# Patient Record
Sex: FEMALE | Race: WHITE | NOT HISPANIC OR LATINO | Employment: OTHER | URBAN - METROPOLITAN AREA
[De-identification: names, ages, dates, MRNs, and addresses within clinical notes are randomized per-mention and may not be internally consistent; named-entity substitution may affect disease eponyms.]

---

## 2017-01-10 ENCOUNTER — LAB (OUTPATIENT)
Dept: INTERNAL MEDICINE | Facility: CLINIC | Age: 52
End: 2017-01-10

## 2017-01-10 DIAGNOSIS — E53.8 B12 DEFICIENCY: Primary | ICD-10-CM

## 2017-01-10 PROCEDURE — 96372 THER/PROPH/DIAG INJ SC/IM: CPT | Performed by: INTERNAL MEDICINE

## 2017-01-10 RX ADMIN — CYANOCOBALAMIN 1000 MCG: 1000 INJECTION, SOLUTION INTRAMUSCULAR; SUBCUTANEOUS at 15:11

## 2017-02-20 ENCOUNTER — LAB (OUTPATIENT)
Dept: INTERNAL MEDICINE | Facility: CLINIC | Age: 52
End: 2017-02-20

## 2017-02-20 DIAGNOSIS — E53.8 B12 DEFICIENCY: Primary | ICD-10-CM

## 2017-02-20 PROCEDURE — 96372 THER/PROPH/DIAG INJ SC/IM: CPT | Performed by: INTERNAL MEDICINE

## 2017-02-20 RX ORDER — CYANOCOBALAMIN 1000 UG/ML
1000 INJECTION, SOLUTION INTRAMUSCULAR; SUBCUTANEOUS
Status: DISCONTINUED | OUTPATIENT
Start: 2017-02-20 | End: 2020-10-30

## 2017-02-20 RX ADMIN — CYANOCOBALAMIN 1000 MCG: 1000 INJECTION, SOLUTION INTRAMUSCULAR; SUBCUTANEOUS at 09:00

## 2017-05-15 ENCOUNTER — LAB (OUTPATIENT)
Dept: INTERNAL MEDICINE | Facility: CLINIC | Age: 52
End: 2017-05-15

## 2017-05-15 DIAGNOSIS — E53.8 B12 DEFICIENCY: Primary | ICD-10-CM

## 2017-05-15 PROCEDURE — 96372 THER/PROPH/DIAG INJ SC/IM: CPT | Performed by: INTERNAL MEDICINE

## 2017-05-15 RX ORDER — CYANOCOBALAMIN 1000 UG/ML
1000 INJECTION, SOLUTION INTRAMUSCULAR; SUBCUTANEOUS
Status: DISCONTINUED | OUTPATIENT
Start: 2017-05-15 | End: 2020-10-30

## 2017-05-15 RX ADMIN — CYANOCOBALAMIN 1000 MCG: 1000 INJECTION, SOLUTION INTRAMUSCULAR; SUBCUTANEOUS at 08:42

## 2017-06-01 ENCOUNTER — TELEPHONE (OUTPATIENT)
Dept: INTERNAL MEDICINE | Facility: CLINIC | Age: 52
End: 2017-06-01

## 2017-06-01 NOTE — TELEPHONE ENCOUNTER
PATIENT WAS SCHEDULED TO SEE DR. METCALF ON 06/06/2017. PATIENT HAD TO CANCEL THIS APPOINTMENT AND WOULD LIKE TO RESCHEDULE THIS APPOINTMENT. YOU CAN REACH HER BACK [phone]

## 2017-06-20 ENCOUNTER — CLINICAL SUPPORT (OUTPATIENT)
Dept: INTERNAL MEDICINE | Facility: CLINIC | Age: 52
End: 2017-06-20

## 2017-06-20 DIAGNOSIS — E53.8 B12 DEFICIENCY: Primary | ICD-10-CM

## 2017-06-20 PROCEDURE — 96372 THER/PROPH/DIAG INJ SC/IM: CPT | Performed by: INTERNAL MEDICINE

## 2017-06-20 RX ORDER — CYANOCOBALAMIN 1000 UG/ML
1000 INJECTION, SOLUTION INTRAMUSCULAR; SUBCUTANEOUS
Status: DISCONTINUED | OUTPATIENT
Start: 2017-06-20 | End: 2020-10-30

## 2017-06-20 RX ADMIN — CYANOCOBALAMIN 1000 MCG: 1000 INJECTION, SOLUTION INTRAMUSCULAR; SUBCUTANEOUS at 15:50

## 2017-08-28 ENCOUNTER — CLINICAL SUPPORT (OUTPATIENT)
Dept: INTERNAL MEDICINE | Facility: CLINIC | Age: 52
End: 2017-08-28

## 2017-08-28 DIAGNOSIS — E53.8 B12 DEFICIENCY: Primary | ICD-10-CM

## 2017-08-28 PROCEDURE — 96372 THER/PROPH/DIAG INJ SC/IM: CPT | Performed by: INTERNAL MEDICINE

## 2017-08-28 RX ORDER — CYANOCOBALAMIN 1000 UG/ML
1000 INJECTION, SOLUTION INTRAMUSCULAR; SUBCUTANEOUS
Status: DISCONTINUED | OUTPATIENT
Start: 2017-08-28 | End: 2020-10-30

## 2017-08-28 RX ADMIN — CYANOCOBALAMIN 1000 MCG: 1000 INJECTION, SOLUTION INTRAMUSCULAR; SUBCUTANEOUS at 10:16

## 2017-10-11 ENCOUNTER — OFFICE VISIT (OUTPATIENT)
Dept: ENDOCRINOLOGY | Facility: CLINIC | Age: 52
End: 2017-10-11

## 2017-10-11 VITALS
WEIGHT: 199.8 LBS | HEART RATE: 72 BPM | HEIGHT: 65 IN | SYSTOLIC BLOOD PRESSURE: 124 MMHG | BODY MASS INDEX: 33.29 KG/M2 | DIASTOLIC BLOOD PRESSURE: 78 MMHG | OXYGEN SATURATION: 98 %

## 2017-10-11 DIAGNOSIS — R73.03 PREDIABETES: Primary | ICD-10-CM

## 2017-10-11 DIAGNOSIS — E06.3 HYPOTHYROIDISM DUE TO HASHIMOTO'S THYROIDITIS: ICD-10-CM

## 2017-10-11 DIAGNOSIS — E83.52 HYPERCALCEMIA: ICD-10-CM

## 2017-10-11 DIAGNOSIS — E55.9 VITAMIN D DEFICIENCY: ICD-10-CM

## 2017-10-11 DIAGNOSIS — E03.8 HYPOTHYROIDISM DUE TO HASHIMOTO'S THYROIDITIS: ICD-10-CM

## 2017-10-11 LAB
25(OH)D3 SERPL-MCNC: 28.1 NG/ML
ABO GROUP BLD: NORMAL
ALBUMIN SERPL-MCNC: 4.4 G/DL (ref 3.2–4.8)
ALBUMIN/GLOB SERPL: 1.7 G/DL (ref 1.5–2.5)
ALP SERPL-CCNC: 65 U/L (ref 25–100)
ALT SERPL W P-5'-P-CCNC: 30 U/L (ref 7–40)
ANION GAP SERPL CALCULATED.3IONS-SCNC: 2 MMOL/L (ref 3–11)
ARTICHOKE IGE QN: 118 MG/DL (ref 0–130)
AST SERPL-CCNC: 20 U/L (ref 0–33)
BILIRUB SERPL-MCNC: 0.4 MG/DL (ref 0.3–1.2)
BUN BLD-MCNC: 20 MG/DL (ref 9–23)
BUN/CREAT SERPL: 28.6 (ref 7–25)
CALCIUM SPEC-SCNC: 9.8 MG/DL (ref 8.7–10.4)
CHLORIDE SERPL-SCNC: 108 MMOL/L (ref 99–109)
CHOLEST SERPL-MCNC: 189 MG/DL (ref 0–200)
CO2 SERPL-SCNC: 34 MMOL/L (ref 20–31)
CREAT BLD-MCNC: 0.7 MG/DL (ref 0.6–1.3)
GFR SERPL CREATININE-BSD FRML MDRD: 88 ML/MIN/1.73
GLOBULIN UR ELPH-MCNC: 2.6 GM/DL
GLUCOSE BLD-MCNC: 112 MG/DL (ref 70–100)
GLUCOSE BLDC GLUCOMTR-MCNC: 127 MG/DL (ref 70–130)
HBA1C MFR BLD: 5.7 %
HDLC SERPL-MCNC: 39 MG/DL (ref 40–60)
POTASSIUM BLD-SCNC: 5 MMOL/L (ref 3.5–5.5)
PROT SERPL-MCNC: 7 G/DL (ref 5.7–8.2)
RH BLD: POSITIVE
SODIUM BLD-SCNC: 144 MMOL/L (ref 132–146)
T4 FREE SERPL-MCNC: 1.34 NG/DL (ref 0.89–1.76)
TRIGL SERPL-MCNC: 168 MG/DL (ref 0–150)
TSH SERPL DL<=0.05 MIU/L-ACNC: 0.55 MIU/ML (ref 0.35–5.35)
VIT B12 BLD-MCNC: 343 PG/ML (ref 211–911)

## 2017-10-11 PROCEDURE — 80061 LIPID PANEL: CPT | Performed by: INTERNAL MEDICINE

## 2017-10-11 PROCEDURE — 83036 HEMOGLOBIN GLYCOSYLATED A1C: CPT | Performed by: INTERNAL MEDICINE

## 2017-10-11 PROCEDURE — 84439 ASSAY OF FREE THYROXINE: CPT | Performed by: INTERNAL MEDICINE

## 2017-10-11 PROCEDURE — 86900 BLOOD TYPING SEROLOGIC ABO: CPT | Performed by: INTERNAL MEDICINE

## 2017-10-11 PROCEDURE — 80053 COMPREHEN METABOLIC PANEL: CPT | Performed by: INTERNAL MEDICINE

## 2017-10-11 PROCEDURE — 99213 OFFICE O/P EST LOW 20 MIN: CPT | Performed by: INTERNAL MEDICINE

## 2017-10-11 PROCEDURE — 84443 ASSAY THYROID STIM HORMONE: CPT | Performed by: INTERNAL MEDICINE

## 2017-10-11 PROCEDURE — 82947 ASSAY GLUCOSE BLOOD QUANT: CPT | Performed by: INTERNAL MEDICINE

## 2017-10-11 PROCEDURE — 96372 THER/PROPH/DIAG INJ SC/IM: CPT | Performed by: INTERNAL MEDICINE

## 2017-10-11 PROCEDURE — 82306 VITAMIN D 25 HYDROXY: CPT | Performed by: INTERNAL MEDICINE

## 2017-10-11 PROCEDURE — 82607 VITAMIN B-12: CPT | Performed by: INTERNAL MEDICINE

## 2017-10-11 PROCEDURE — 86901 BLOOD TYPING SEROLOGIC RH(D): CPT | Performed by: INTERNAL MEDICINE

## 2017-10-11 RX ORDER — ERGOCALCIFEROL 1.25 MG/1
1 CAPSULE ORAL WEEKLY
Qty: 4 CAPSULE | Refills: 5 | Status: SHIPPED | OUTPATIENT
Start: 2017-10-11 | End: 2022-12-15 | Stop reason: SDUPTHER

## 2017-10-11 RX ORDER — LEVOTHYROXINE SODIUM 112 MCG
112 TABLET ORAL DAILY
Qty: 90 TABLET | Refills: 3 | Status: SHIPPED | OUTPATIENT
Start: 2017-10-11 | End: 2018-12-18 | Stop reason: SDUPTHER

## 2017-10-11 RX ADMIN — CYANOCOBALAMIN 1000 MCG: 1000 INJECTION, SOLUTION INTRAMUSCULAR; SUBCUTANEOUS at 08:55

## 2017-10-11 NOTE — PROGRESS NOTES
Chief complaint  Hashimoto's Thyroiditis (F/u for hypothyroidism, vitamin d deficiency and prediabetes); Vitamin D Deficiency; and Prediabetes    Subjective   Adeola Izaguirre is a 52 y.o. female is here today for follow-up.  Hypothyroidism: The patient is being seen for follow-up of hypothyroidism. The patient has Hashimoto's thyroiditis since age 17. Recent results: reviewed. Current treatment includes Synthroid 112 mcg.   Symptoms: fatigue, weight gain, constipation, diarrhea, weakness, myalgias, arthralgias and peripheral edema. The patient is currently experiencing symptoms.     Prediabetes her A1C was stable and she is managing prediabetes with diet, avoids pasta, bread.     Vitamin D deficiency 50 000 IU every other week and 5000 IU daily     B12 level was low and she had monthly injections and didn't noticed significant improvement. Last injection was August 28.       Medications    Current Outpatient Prescriptions:   •  Cholecalciferol (VITAMIN D3) 5000 UNITS tablet, Take  by mouth., Disp: , Rfl:   •  DULoxetine (CYMBALTA) 30 MG capsule, TAKE ONE CAPSULE BY MOUTH EVERY DAY, Disp: , Rfl: 5  •  ergocalciferol (ERGOCALCIFEROL) 05591 UNITS capsule, Take 1 capsule by mouth 1 (One) Time Per Week., Disp: 15 capsule, Rfl: 3  •  SYNTHROID 112 MCG tablet, Take 1 tablet by mouth Daily., Disp: 90 tablet, Rfl: 3  •  travoprost, KISHAN free, (TRAVATAN Z) 0.004 % solution ophthalmic solution, Apply  to eye., Disp: , Rfl:     Current Facility-Administered Medications:   •  cyanocobalamin injection 1,000 mcg, 1,000 mcg, Intramuscular, Q28 Days, Betty MAX MD  •  cyanocobalamin injection 1,000 mcg, 1,000 mcg, Intramuscular, Q30 Days, Betty MAX MD, 1,000 mcg at 02/20/17 0900  •  cyanocobalamin injection 1,000 mcg, 1,000 mcg, Intramuscular, Q28 Days, Betty MAX MD, 1,000 mcg at 05/15/17 0842  •  cyanocobalamin injection 1,000 mcg, 1,000 mcg, Intramuscular, Q28 Days, Betty MAX MD, 1,000 mcg at 06/20/17 1550  •   "cyanocobalamin injection 1,000 mcg, 1,000 mcg, Intramuscular, Q28 Days, Betty MAX MD, 1,000 mcg at 08/28/17 1016    PMH  The following portions of the patient's history were reviewed and updated as appropriate: allergies, current medications, past family history, past medical history, past social history, past surgical history and problem list.    Review of systems  Review of Systems   Constitutional: Positive for fatigue and unexpected weight change (weight gain). Negative for activity change, appetite change, chills and diaphoresis.   HENT: Negative for congestion, ear pain, facial swelling, hearing loss, postnasal drip, sore throat, trouble swallowing and voice change.    Eyes: Negative.  Negative for redness, itching and visual disturbance.   Respiratory: Negative for cough and shortness of breath.    Cardiovascular: Positive for leg swelling. Negative for chest pain and palpitations.   Gastrointestinal: Negative for abdominal distention, abdominal pain, constipation, diarrhea, nausea and vomiting.   Endocrine:        As listed in HPI   Genitourinary: Negative.    Musculoskeletal: Positive for arthralgias and myalgias. Negative for back pain, joint swelling, neck pain and neck stiffness.   Skin: Negative.    Allergic/Immunologic: Negative.    Neurological: Negative for dizziness, tremors, syncope, weakness, light-headedness and headaches.   Hematological: Negative.    Psychiatric/Behavioral: Negative.    All other systems reviewed and are negative.      Physical exam  Objective   Blood pressure 124/78, pulse 72, height 65\" (165.1 cm), weight 199 lb 12.8 oz (90.6 kg), SpO2 98 %. Body mass index is 33.25 kg/(m^2).  Physical Exam   Constitutional: She is oriented to person, place, and time. She appears well-developed and well-nourished.   HENT:   Head: Normocephalic and atraumatic.   Mouth/Throat: Oropharynx is clear and moist.   Eyes: Conjunctivae are normal.   Neck: Normal range of motion. No muscular " tenderness present. Carotid bruit is not present. No thyroid mass and no thyromegaly present.   The neck is supple and no assimetry visualized   Cardiovascular: Normal rate, regular rhythm and normal heart sounds.    Pulmonary/Chest: Effort normal and breath sounds normal.   Musculoskeletal: She exhibits no edema.   Lymphadenopathy:     She has no cervical adenopathy.   Neurological: She is alert and oriented to person, place, and time.   Skin: Skin is warm. No rash noted.   Psychiatric: She has a normal mood and affect. Thought content normal.   Vitals reviewed.        LABS AND IMAGING  Office Visit on 10/11/2017   Component Date Value Ref Range Status   • Hemoglobin A1C 10/11/2017 5.7  % Final   • Glucose 10/11/2017 127  70 - 130 mg/dL Final           Assessment  Assessment/Plan   Problem List Items Addressed This Visit        Digestive    Vitamin D deficiency       Endocrine    Hypothyroidism       Other    Prediabetes - Primary    Relevant Orders    POC Glycosylated Hemoglobin (Hb A1C) (Completed)    POC Glucose Fingerstick (Completed)    Hypercalcemia          Plan  -Synthroid 112 mcg.  -B12 injections recommended, the level is low - adminisster first dose today, monthly afterwards  -Restart Vit D 50 000 IU weekly in addition to 5000 daily OTC supplements.   -Continue with lifestyle and diet. Doing well.    -reviewed diet.   Repeat labs in 4 months before the visit: A1C, CMP, TSh, free T4 and Vit D level.

## 2017-10-11 NOTE — PATIENT INSTRUCTIONS
Results for orders placed or performed in visit on 10/11/17   POC Glycosylated Hemoglobin (Hb A1C)   Result Value Ref Range    Hemoglobin A1C 5.7 %   POC Glucose Fingerstick   Result Value Ref Range    Glucose 127 70 - 130 mg/dL

## 2018-03-22 ENCOUNTER — TRANSCRIBE ORDERS (OUTPATIENT)
Dept: ADMINISTRATIVE | Facility: HOSPITAL | Age: 53
End: 2018-03-22

## 2018-03-22 DIAGNOSIS — N93.9 UTERINE BLEEDING: Primary | ICD-10-CM

## 2018-03-27 ENCOUNTER — APPOINTMENT (OUTPATIENT)
Dept: ULTRASOUND IMAGING | Facility: HOSPITAL | Age: 53
End: 2018-03-27

## 2018-03-29 ENCOUNTER — APPOINTMENT (OUTPATIENT)
Dept: ULTRASOUND IMAGING | Facility: HOSPITAL | Age: 53
End: 2018-03-29

## 2018-04-27 ENCOUNTER — HOSPITAL ENCOUNTER (OUTPATIENT)
Dept: ULTRASOUND IMAGING | Facility: HOSPITAL | Age: 53
Discharge: HOME | End: 2018-04-27
Admitting: FAMILY MEDICINE

## 2018-04-27 DIAGNOSIS — N93.9 UTERINE BLEEDING: ICD-10-CM

## 2018-04-27 PROCEDURE — 76830 TRANSVAGINAL US NON-OB: CPT

## 2018-12-18 RX ORDER — LEVOTHYROXINE SODIUM 112 MCG
112 TABLET ORAL DAILY
Qty: 30 TABLET | Refills: 0 | Status: SHIPPED | OUTPATIENT
Start: 2018-12-18 | End: 2019-01-10 | Stop reason: SDUPTHER

## 2019-01-02 RX ORDER — LEVOTHYROXINE SODIUM 112 MCG
112 TABLET ORAL DAILY
Qty: 90 TABLET | Refills: 3 | OUTPATIENT
Start: 2019-01-02

## 2019-01-10 RX ORDER — LEVOTHYROXINE SODIUM 112 MCG
112 TABLET ORAL DAILY
Qty: 30 TABLET | Refills: 3 | Status: SHIPPED | OUTPATIENT
Start: 2019-01-10 | End: 2019-05-01 | Stop reason: SDUPTHER

## 2019-01-10 NOTE — TELEPHONE ENCOUNTER
Received refill request via fax from [facility] pharmacy for Synthroid 112, refilled enough med for pt to last until next follow up appt with Dr. Hernandez.

## 2019-05-01 RX ORDER — LEVOTHYROXINE SODIUM 112 MCG
TABLET ORAL
Qty: 30 TABLET | Refills: 2 | Status: SHIPPED | OUTPATIENT
Start: 2019-05-01 | End: 2019-05-08

## 2019-05-06 ENCOUNTER — TELEPHONE (OUTPATIENT)
Dept: INTERNAL MEDICINE | Facility: CLINIC | Age: 54
End: 2019-05-06

## 2019-05-06 DIAGNOSIS — E83.52 HYPERCALCEMIA: ICD-10-CM

## 2019-05-06 DIAGNOSIS — E55.9 VITAMIN D DEFICIENCY: Primary | ICD-10-CM

## 2019-05-06 DIAGNOSIS — E53.8 B12 DEFICIENCY: ICD-10-CM

## 2019-05-06 DIAGNOSIS — E03.9 ACQUIRED HYPOTHYROIDISM: ICD-10-CM

## 2019-05-06 DIAGNOSIS — R73.03 PREDIABETES: ICD-10-CM

## 2019-05-06 NOTE — TELEPHONE ENCOUNTER
PT HAS AN APPT ON Wednesday AND WAS UNDER THE IMPRESSION THAT SHE WOULD NEED TO GET LABS DONE PRIOR TO.    THERE ARE NO OPEN ORDERS.    PLEASE ADVISE.

## 2019-05-07 ENCOUNTER — LAB (OUTPATIENT)
Dept: INTERNAL MEDICINE | Facility: CLINIC | Age: 54
End: 2019-05-07

## 2019-05-07 DIAGNOSIS — E83.52 HYPERCALCEMIA: ICD-10-CM

## 2019-05-07 DIAGNOSIS — E55.9 VITAMIN D DEFICIENCY: ICD-10-CM

## 2019-05-07 DIAGNOSIS — R73.03 PREDIABETES: ICD-10-CM

## 2019-05-07 DIAGNOSIS — E03.9 ACQUIRED HYPOTHYROIDISM: ICD-10-CM

## 2019-05-07 DIAGNOSIS — E53.8 B12 DEFICIENCY: ICD-10-CM

## 2019-05-07 LAB
ALBUMIN SERPL-MCNC: 4.5 G/DL (ref 3.5–5.2)
ALBUMIN/GLOB SERPL: 1.8 G/DL
ALP SERPL-CCNC: 48 U/L (ref 39–117)
ALT SERPL W P-5'-P-CCNC: 24 U/L (ref 1–33)
ANION GAP SERPL CALCULATED.3IONS-SCNC: 11.8 MMOL/L
AST SERPL-CCNC: 22 U/L (ref 1–32)
BILIRUB SERPL-MCNC: 0.4 MG/DL (ref 0.2–1.2)
BUN BLD-MCNC: 15 MG/DL (ref 6–20)
BUN/CREAT SERPL: 22.1 (ref 7–25)
CALCIUM SPEC-SCNC: 9.3 MG/DL (ref 8.6–10.5)
CHLORIDE SERPL-SCNC: 108 MMOL/L (ref 98–107)
CHOLEST SERPL-MCNC: 164 MG/DL (ref 0–200)
CO2 SERPL-SCNC: 25.2 MMOL/L (ref 22–29)
CREAT BLD-MCNC: 0.68 MG/DL (ref 0.57–1)
GFR SERPL CREATININE-BSD FRML MDRD: 90 ML/MIN/1.73
GLOBULIN UR ELPH-MCNC: 2.5 GM/DL
GLUCOSE BLD-MCNC: 92 MG/DL (ref 65–99)
HBA1C MFR BLD: 5.7 % (ref 4.8–5.6)
HDLC SERPL-MCNC: 37 MG/DL (ref 40–60)
LDLC SERPL CALC-MCNC: 101 MG/DL (ref 0–100)
LDLC/HDLC SERPL: 2.74 {RATIO}
POTASSIUM BLD-SCNC: 4.7 MMOL/L (ref 3.5–5.2)
PROT SERPL-MCNC: 7 G/DL (ref 6–8.5)
SODIUM BLD-SCNC: 145 MMOL/L (ref 136–145)
T4 FREE SERPL-MCNC: 1.48 NG/DL (ref 0.93–1.7)
TRIGL SERPL-MCNC: 128 MG/DL (ref 0–150)
TSH SERPL DL<=0.05 MIU/L-ACNC: 0.23 MIU/ML (ref 0.27–4.2)
VIT B12 BLD-MCNC: 273 PG/ML (ref 211–946)
VLDLC SERPL-MCNC: 25.6 MG/DL (ref 5–40)

## 2019-05-07 PROCEDURE — 84439 ASSAY OF FREE THYROXINE: CPT | Performed by: INTERNAL MEDICINE

## 2019-05-07 PROCEDURE — 82607 VITAMIN B-12: CPT | Performed by: INTERNAL MEDICINE

## 2019-05-07 PROCEDURE — 80053 COMPREHEN METABOLIC PANEL: CPT | Performed by: INTERNAL MEDICINE

## 2019-05-07 PROCEDURE — 84443 ASSAY THYROID STIM HORMONE: CPT | Performed by: INTERNAL MEDICINE

## 2019-05-07 PROCEDURE — 82652 VIT D 1 25-DIHYDROXY: CPT | Performed by: INTERNAL MEDICINE

## 2019-05-07 PROCEDURE — 83036 HEMOGLOBIN GLYCOSYLATED A1C: CPT | Performed by: INTERNAL MEDICINE

## 2019-05-07 PROCEDURE — 80061 LIPID PANEL: CPT | Performed by: INTERNAL MEDICINE

## 2019-05-08 ENCOUNTER — OFFICE VISIT (OUTPATIENT)
Dept: ENDOCRINOLOGY | Facility: CLINIC | Age: 54
End: 2019-05-08

## 2019-05-08 VITALS
OXYGEN SATURATION: 98 % | HEIGHT: 65 IN | BODY MASS INDEX: 34.45 KG/M2 | WEIGHT: 206.8 LBS | DIASTOLIC BLOOD PRESSURE: 78 MMHG | SYSTOLIC BLOOD PRESSURE: 118 MMHG | HEART RATE: 68 BPM

## 2019-05-08 DIAGNOSIS — E55.9 VITAMIN D DEFICIENCY: ICD-10-CM

## 2019-05-08 DIAGNOSIS — E06.3 HYPOTHYROIDISM DUE TO HASHIMOTO'S THYROIDITIS: Primary | ICD-10-CM

## 2019-05-08 DIAGNOSIS — E03.8 HYPOTHYROIDISM DUE TO HASHIMOTO'S THYROIDITIS: Primary | ICD-10-CM

## 2019-05-08 DIAGNOSIS — R73.03 PREDIABETES: ICD-10-CM

## 2019-05-08 DIAGNOSIS — E53.8 B12 DEFICIENCY: ICD-10-CM

## 2019-05-08 PROCEDURE — 96372 THER/PROPH/DIAG INJ SC/IM: CPT | Performed by: INTERNAL MEDICINE

## 2019-05-08 PROCEDURE — 99214 OFFICE O/P EST MOD 30 MIN: CPT | Performed by: INTERNAL MEDICINE

## 2019-05-08 RX ORDER — LEVOTHYROXINE AND LIOTHYRONINE 38; 9 UG/1; UG/1
60 TABLET ORAL DAILY
Qty: 30 TABLET | Refills: 11 | COMMUNITY
Start: 2019-05-08 | End: 2020-10-30

## 2019-05-08 RX ORDER — CYANOCOBALAMIN 1000 UG/ML
1000 INJECTION, SOLUTION INTRAMUSCULAR; SUBCUTANEOUS
Status: DISCONTINUED | OUTPATIENT
Start: 2019-05-08 | End: 2020-10-30

## 2019-05-08 RX ORDER — LEVOTHYROXINE SODIUM 112 MCG
112 TABLET ORAL DAILY
Qty: 30 TABLET | Refills: 11 | Status: SHIPPED | OUTPATIENT
Start: 2019-05-08 | End: 2020-05-20

## 2019-05-08 RX ADMIN — CYANOCOBALAMIN 1000 MCG: 1000 INJECTION, SOLUTION INTRAMUSCULAR; SUBCUTANEOUS at 12:54

## 2019-05-08 NOTE — PROGRESS NOTES
Chief complaint  Hypothyroidism (Follow Up) and Prediabetes    Subjective   Adeola Izaguirre is a 54 y.o. female is here today for follow-up.  Hypothyroidism: The patient is being seen for follow-up of hypothyroidism. The patient has Hashimoto's thyroiditis since age 17. Recent results: reviewed. Current treatment includes Synthroid 112 mcg.   Symptoms: fatigue, weight gain, constipation, diarrhea, weakness, myalgias, arthralgias and peripheral edema. The patient is currently experiencing symptoms. She would like to try different medication.     Prediabetes her A1C was stable and she is managing prediabetes with diet, avoids pasta, bread.     Vitamin D deficiency 50 000 IU every other week and 5000 IU daily     B12 level was low and she had monthly injections and didn't noticed significant improvement. She stopped them and levels are low on recent labs.       Medications    Current Outpatient Medications:   •  Cholecalciferol (VITAMIN D3) 5000 UNITS tablet, Take  by mouth., Disp: , Rfl:   •  ergocalciferol (ERGOCALCIFEROL) 47127 units capsule, Take 1 capsule by mouth 1 (One) Time Per Week., Disp: 4 capsule, Rfl: 5  •  SYNTHROID 112 MCG tablet, Take 1 tablet by mouth Daily., Disp: 30 tablet, Rfl: 11  •  travoprost, KISHAN free, (TRAVATAN Z) 0.004 % solution ophthalmic solution, Apply  to eye., Disp: , Rfl:   •  DULoxetine (CYMBALTA) 30 MG capsule, TAKE ONE CAPSULE BY MOUTH EVERY DAY, Disp: , Rfl: 5    Current Facility-Administered Medications:   •  cyanocobalamin injection 1,000 mcg, 1,000 mcg, Intramuscular, Q28 Days, Betty Hernandez MD  •  cyanocobalamin injection 1,000 mcg, 1,000 mcg, Intramuscular, Q30 Days, Betty Hernandez MD, 1,000 mcg at 02/20/17 0900  •  cyanocobalamin injection 1,000 mcg, 1,000 mcg, Intramuscular, Q28 Days, Betty Hernandez MD, 1,000 mcg at 05/15/17 0842  •  cyanocobalamin injection 1,000 mcg, 1,000 mcg, Intramuscular, Q28 Days, Betty Hernandez MD, 1,000 mcg at 10/11/17 0855  •  cyanocobalamin  "injection 1,000 mcg, 1,000 mcg, Intramuscular, Q28 Days, Betty Hernandez MD, 1,000 mcg at 08/28/17 1016    PMH  The following portions of the patient's history were reviewed and updated as appropriate: allergies, current medications, past family history, past medical history, past social history, past surgical history and problem list.    Review of systems  Review of Systems   Constitutional: Positive for fatigue and unexpected weight change (weight gain). Negative for activity change, appetite change, chills and diaphoresis.   HENT: Negative for congestion, ear pain, facial swelling, hearing loss, postnasal drip, sore throat, trouble swallowing and voice change.    Eyes: Negative.  Negative for redness, itching and visual disturbance.   Respiratory: Negative for cough and shortness of breath.    Cardiovascular: Positive for leg swelling. Negative for chest pain and palpitations.   Gastrointestinal: Negative for abdominal distention, abdominal pain, constipation, diarrhea, nausea and vomiting.   Endocrine:        As listed in HPI   Genitourinary: Negative.    Musculoskeletal: Positive for arthralgias and myalgias. Negative for back pain, joint swelling, neck pain and neck stiffness.   Skin: Negative.    Allergic/Immunologic: Negative.    Neurological: Negative for dizziness, tremors, syncope, weakness, light-headedness and headaches.   Hematological: Negative.    Psychiatric/Behavioral: Negative.    All other systems reviewed and are negative.      Physical exam  Objective   Blood pressure 118/78, pulse 68, height 165.1 cm (65\"), weight 93.8 kg (206 lb 12.8 oz), SpO2 98 %. Body mass index is 34.41 kg/m².  Physical Exam   Constitutional: She is oriented to person, place, and time. She appears well-developed and well-nourished.   HENT:   Head: Normocephalic and atraumatic.   Mouth/Throat: Oropharynx is clear and moist.   Eyes: Conjunctivae are normal.   Neck: Normal range of motion. No muscular tenderness present. " Carotid bruit is not present. No thyroid mass and no thyromegaly present.   The neck is supple and no assimetry visualized   Cardiovascular: Normal rate, regular rhythm and normal heart sounds.   Pulmonary/Chest: Effort normal and breath sounds normal.   Musculoskeletal: She exhibits no edema.   Lymphadenopathy:     She has no cervical adenopathy.   Neurological: She is alert and oriented to person, place, and time.   Skin: Skin is warm. No rash noted.   Psychiatric: She has a normal mood and affect. Thought content normal.   Vitals reviewed.        LABS AND IMAGING  Lab on 05/07/2019   Component Date Value Ref Range Status   • Total Cholesterol 05/07/2019 164  0 - 200 mg/dL Final   • Triglycerides 05/07/2019 128  0 - 150 mg/dL Final   • HDL Cholesterol 05/07/2019 37* 40 - 60 mg/dL Final   • LDL Cholesterol  05/07/2019 101* 0 - 100 mg/dL Final   • VLDL Cholesterol 05/07/2019 25.6  5 - 40 mg/dL Final   • LDL/HDL Ratio 05/07/2019 2.74   Final   • Vitamin B-12 05/07/2019 273  211 - 946 pg/mL Final   • Glucose 05/07/2019 92  65 - 99 mg/dL Final   • BUN 05/07/2019 15  6 - 20 mg/dL Final   • Creatinine 05/07/2019 0.68  0.57 - 1.00 mg/dL Final   • Sodium 05/07/2019 145  136 - 145 mmol/L Final   • Potassium 05/07/2019 4.7  3.5 - 5.2 mmol/L Final   • Chloride 05/07/2019 108* 98 - 107 mmol/L Final   • CO2 05/07/2019 25.2  22.0 - 29.0 mmol/L Final   • Calcium 05/07/2019 9.3  8.6 - 10.5 mg/dL Final   • Total Protein 05/07/2019 7.0  6.0 - 8.5 g/dL Final   • Albumin 05/07/2019 4.50  3.50 - 5.20 g/dL Final   • ALT (SGPT) 05/07/2019 24  1 - 33 U/L Final   • AST (SGOT) 05/07/2019 22  1 - 32 U/L Final   • Alkaline Phosphatase 05/07/2019 48  39 - 117 U/L Final   • Total Bilirubin 05/07/2019 0.4  0.2 - 1.2 mg/dL Final   • eGFR Non African Amer 05/07/2019 90  >60 mL/min/1.73 Final   • Globulin 05/07/2019 2.5  gm/dL Final   • A/G Ratio 05/07/2019 1.8  g/dL Final   • BUN/Creatinine Ratio 05/07/2019 22.1  7.0 - 25.0 Final   • Anion Gap  05/07/2019 11.8  mmol/L Final   • Free T4 05/07/2019 1.48  0.93 - 1.70 ng/dL Final   • TSH 05/07/2019 0.230* 0.270 - 4.200 mIU/mL Final   • Hemoglobin A1C 05/07/2019 5.70* 4.80 - 5.60 % Final           Assessment  Assessment/Plan   Problem List Items Addressed This Visit        Digestive    Vitamin D deficiency    B12 deficiency       Endocrine    Hypothyroidism - Primary    Relevant Medications    SYNTHROID 112 MCG tablet       Other    Prediabetes          Plan  -Synthroid 112 mcg changed to a trial of NP thyroid 60 mg. She would llike to try something with metabolic and energy benefits.     -B12 injections restarted , administer a dose today.     -Vit D 5000 daily OTC supplements.     -Continue with lifestyle and diet. Doing well, A1C is stable.    -reviewed diet. Lab results reviewed with the patient.     Repeat labs in 4 months

## 2019-05-09 LAB — 1,25(OH)2D3 SERPL-MCNC: 56.8 PG/ML (ref 19.9–79.3)

## 2019-06-17 ENCOUNTER — CLINICAL SUPPORT (OUTPATIENT)
Dept: INTERNAL MEDICINE | Facility: CLINIC | Age: 54
End: 2019-06-17

## 2019-06-17 DIAGNOSIS — E53.8 B12 DEFICIENCY: Primary | ICD-10-CM

## 2019-06-17 PROCEDURE — 96372 THER/PROPH/DIAG INJ SC/IM: CPT | Performed by: INTERNAL MEDICINE

## 2019-06-17 RX ADMIN — CYANOCOBALAMIN 1000 MCG: 1000 INJECTION, SOLUTION INTRAMUSCULAR; SUBCUTANEOUS at 10:34

## 2020-01-27 ENCOUNTER — TRANSCRIBE ORDERS (OUTPATIENT)
Dept: LAB | Facility: HOSPITAL | Age: 55
End: 2020-01-27

## 2020-01-27 ENCOUNTER — LAB (OUTPATIENT)
Dept: LAB | Facility: HOSPITAL | Age: 55
End: 2020-01-27

## 2020-01-27 DIAGNOSIS — E03.9 MYXEDEMA HEART DISEASE: ICD-10-CM

## 2020-01-27 DIAGNOSIS — R73.03 DIABETES MELLITUS, LATENT: ICD-10-CM

## 2020-01-27 DIAGNOSIS — I51.9 MYXEDEMA HEART DISEASE: ICD-10-CM

## 2020-01-27 DIAGNOSIS — R73.03 DIABETES MELLITUS, LATENT: Primary | ICD-10-CM

## 2020-01-27 DIAGNOSIS — E55.9 MILD VITAMIN D DEFICIENCY: ICD-10-CM

## 2020-01-27 DIAGNOSIS — E78.5 HYPERLIPIDEMIA, UNSPECIFIED HYPERLIPIDEMIA TYPE: ICD-10-CM

## 2020-01-27 LAB
25(OH)D3 SERPL-MCNC: 20.3 NG/ML (ref 30–100)
ALBUMIN SERPL-MCNC: 4.4 G/DL (ref 3.5–5.2)
ALBUMIN/GLOB SERPL: 1.4 G/DL
ALP SERPL-CCNC: 48 U/L (ref 39–117)
ALT SERPL W P-5'-P-CCNC: 25 U/L (ref 1–33)
ANION GAP SERPL CALCULATED.3IONS-SCNC: 13 MMOL/L (ref 5–15)
AST SERPL-CCNC: 21 U/L (ref 1–32)
BILIRUB SERPL-MCNC: 0.4 MG/DL (ref 0.2–1.2)
BUN BLD-MCNC: 15 MG/DL (ref 6–20)
BUN/CREAT SERPL: 21.1 (ref 7–25)
CALCIUM SPEC-SCNC: 9.7 MG/DL (ref 8.6–10.5)
CHLORIDE SERPL-SCNC: 106 MMOL/L (ref 98–107)
CHOLEST SERPL-MCNC: 171 MG/DL (ref 0–200)
CO2 SERPL-SCNC: 24 MMOL/L (ref 22–29)
CREAT BLD-MCNC: 0.71 MG/DL (ref 0.57–1)
GFR SERPL CREATININE-BSD FRML MDRD: 85 ML/MIN/1.73
GLOBULIN UR ELPH-MCNC: 3.1 GM/DL
GLUCOSE BLD-MCNC: 106 MG/DL (ref 65–99)
HBA1C MFR BLD: 6.18 % (ref 4.8–5.6)
HDLC SERPL-MCNC: 38 MG/DL (ref 40–60)
LDLC SERPL CALC-MCNC: 103 MG/DL (ref 0–100)
LDLC/HDLC SERPL: 2.7 {RATIO}
POTASSIUM BLD-SCNC: 4.2 MMOL/L (ref 3.5–5.2)
PROT SERPL-MCNC: 7.5 G/DL (ref 6–8.5)
SODIUM BLD-SCNC: 143 MMOL/L (ref 136–145)
TRIGL SERPL-MCNC: 152 MG/DL (ref 0–150)
TSH SERPL DL<=0.05 MIU/L-ACNC: 0.38 UIU/ML (ref 0.27–4.2)
VIT B12 BLD-MCNC: 322 PG/ML (ref 211–946)
VLDLC SERPL-MCNC: 30.4 MG/DL (ref 5–40)

## 2020-01-27 PROCEDURE — 36415 COLL VENOUS BLD VENIPUNCTURE: CPT

## 2020-01-27 PROCEDURE — 80053 COMPREHEN METABOLIC PANEL: CPT

## 2020-01-27 PROCEDURE — 83036 HEMOGLOBIN GLYCOSYLATED A1C: CPT

## 2020-01-27 PROCEDURE — 84443 ASSAY THYROID STIM HORMONE: CPT

## 2020-01-27 PROCEDURE — 80061 LIPID PANEL: CPT

## 2020-01-27 PROCEDURE — 82306 VITAMIN D 25 HYDROXY: CPT

## 2020-01-27 PROCEDURE — 82607 VITAMIN B-12: CPT

## 2020-05-20 RX ORDER — LEVOTHYROXINE SODIUM 112 MCG
TABLET ORAL
Qty: 90 TABLET | Refills: 0 | Status: SHIPPED | OUTPATIENT
Start: 2020-05-20 | End: 2020-08-31

## 2020-08-31 RX ORDER — LEVOTHYROXINE SODIUM 112 MCG
TABLET ORAL
Qty: 90 TABLET | Refills: 0 | Status: SHIPPED | OUTPATIENT
Start: 2020-08-31 | End: 2020-10-30

## 2020-10-30 ENCOUNTER — OFFICE VISIT (OUTPATIENT)
Dept: ENDOCRINOLOGY | Facility: CLINIC | Age: 55
End: 2020-10-30

## 2020-10-30 VITALS
WEIGHT: 215 LBS | TEMPERATURE: 97.5 F | OXYGEN SATURATION: 98 % | DIASTOLIC BLOOD PRESSURE: 76 MMHG | HEART RATE: 77 BPM | BODY MASS INDEX: 35.82 KG/M2 | SYSTOLIC BLOOD PRESSURE: 118 MMHG | HEIGHT: 65 IN

## 2020-10-30 DIAGNOSIS — E03.8 HYPOTHYROIDISM DUE TO HASHIMOTO'S THYROIDITIS: ICD-10-CM

## 2020-10-30 DIAGNOSIS — E06.3 HYPOTHYROIDISM DUE TO HASHIMOTO'S THYROIDITIS: ICD-10-CM

## 2020-10-30 DIAGNOSIS — E53.8 B12 DEFICIENCY: ICD-10-CM

## 2020-10-30 DIAGNOSIS — R73.03 PREDIABETES: Primary | ICD-10-CM

## 2020-10-30 DIAGNOSIS — E55.9 VITAMIN D DEFICIENCY: ICD-10-CM

## 2020-10-30 LAB
EXPIRATION DATE: NORMAL
EXPIRATION DATE: NORMAL
GLUCOSE BLDC GLUCOMTR-MCNC: 121 MG/DL (ref 70–130)
HBA1C MFR BLD: 6 %
Lab: NORMAL
Lab: NORMAL

## 2020-10-30 PROCEDURE — 82947 ASSAY GLUCOSE BLOOD QUANT: CPT | Performed by: INTERNAL MEDICINE

## 2020-10-30 PROCEDURE — 99213 OFFICE O/P EST LOW 20 MIN: CPT | Performed by: INTERNAL MEDICINE

## 2020-10-30 PROCEDURE — 83036 HEMOGLOBIN GLYCOSYLATED A1C: CPT | Performed by: INTERNAL MEDICINE

## 2020-10-30 PROCEDURE — 96372 THER/PROPH/DIAG INJ SC/IM: CPT | Performed by: INTERNAL MEDICINE

## 2020-10-30 RX ORDER — CYANOCOBALAMIN 1000 UG/ML
1000 INJECTION, SOLUTION INTRAMUSCULAR; SUBCUTANEOUS
Status: DISCONTINUED | OUTPATIENT
Start: 2020-10-30 | End: 2021-11-03

## 2020-10-30 RX ORDER — LEVOTHYROXINE AND LIOTHYRONINE 38; 9 UG/1; UG/1
60 TABLET ORAL DAILY
Qty: 30 TABLET | Refills: 11 | COMMUNITY
Start: 2020-10-30 | End: 2020-11-06 | Stop reason: SDUPTHER

## 2020-10-30 RX ORDER — LATANOPROST 50 UG/ML
SOLUTION/ DROPS OPHTHALMIC
COMMUNITY

## 2020-10-30 RX ADMIN — CYANOCOBALAMIN 1000 MCG: 1000 INJECTION, SOLUTION INTRAMUSCULAR; SUBCUTANEOUS at 12:27

## 2020-10-30 NOTE — PROGRESS NOTES
Chief complaint  Hypothyroidism (Follow Up) and Prediabetes    Subjective   Adeola Izaguirre is a 55 y.o. female is here today for follow-up.  Hypothyroidism: The patient is being seen for follow-up of hypothyroidism. The patient has Hashimoto's thyroiditis since age 17. Recent results: reviewed. Current treatment includes Synthroid 112 mcg.   Symptoms: fatigue, weight gain, constipation, diarrhea, weakness, myalgias, arthralgias and peripheral edema. The patient is currently experiencing symptoms. She would like to try different medication.     Prediabetes her A1C was stable and she is managing prediabetes with diet, avoids pasta, bread.     Vitamin D deficiency 50 000 IU every other week and 5000 IU daily. Levels were very low.      09/30/20 she had episode of the vertigo associated with the nausea, also noted increased frequency in urination.       Medications    Current Outpatient Medications:   •  Cholecalciferol (VITAMIN D3) 5000 UNITS tablet, Take  by mouth., Disp: , Rfl:   •  ergocalciferol (ERGOCALCIFEROL) 09427 units capsule, Take 1 capsule by mouth 1 (One) Time Per Week., Disp: 4 capsule, Rfl: 5  •  latanoprost (XALATAN) 0.005 % ophthalmic solution, latanoprost 0.005 % eye drops, Disp: , Rfl:   •  Thyroid (NP THYROID) 60 MG PO tablet, Take 1 tablet by mouth Daily., Disp: 30 tablet, Rfl: 11    Current Facility-Administered Medications:   •  cyanocobalamin injection 1,000 mcg, 1,000 mcg, Intramuscular, Q28 Days, Betty Hernandez MD, 1,000 mcg at 10/30/20 1227    PMH  The following portions of the patient's history were reviewed and updated as appropriate: allergies, current medications, past family history, past medical history, past social history, past surgical history and problem list.    Review of systems  Review of Systems   Constitutional: Positive for fatigue and unexpected weight change (weight gain). Negative for activity change, appetite change, chills and diaphoresis.   HENT: Negative for  "congestion, ear pain, facial swelling, hearing loss, postnasal drip, sore throat, trouble swallowing and voice change.    Eyes: Positive for itching. Negative for redness and visual disturbance.   Respiratory: Negative for cough and shortness of breath.    Cardiovascular: Positive for leg swelling. Negative for chest pain and palpitations.   Gastrointestinal: Positive for constipation. Negative for abdominal distention, abdominal pain, diarrhea, nausea and vomiting.   Endocrine:        As listed in HPI   Genitourinary: Negative.    Musculoskeletal: Positive for arthralgias, joint swelling and myalgias. Negative for back pain, neck pain and neck stiffness.   Skin: Negative.    Allergic/Immunologic: Negative.    Neurological: Negative for dizziness, tremors, syncope, weakness, light-headedness and headaches.   Hematological: Negative.    Psychiatric/Behavioral: Negative.    All other systems reviewed and are negative.      Physical exam  Objective   Blood pressure 118/76, pulse 77, temperature 97.5 °F (36.4 °C), height 165.1 cm (65\"), weight 97.5 kg (215 lb), SpO2 98 %. Body mass index is 35.78 kg/m².  Physical Exam   Constitutional: She is oriented to person, place, and time. She appears well-developed.   HENT:   Head: Normocephalic and atraumatic.   Eyes: Conjunctivae are normal.   Neck: Normal range of motion. No muscular tenderness present. Carotid bruit is not present. No thyroid mass and no thyromegaly present.   The neck is supple and no assimetry visualized   Cardiovascular: Normal rate, regular rhythm and normal heart sounds.   Pulmonary/Chest: Effort normal and breath sounds normal.   Lymphadenopathy:     She has no cervical adenopathy.   Neurological: She is alert and oriented to person, place, and time.   Skin: Skin is warm. No rash noted.   Psychiatric: Thought content normal.   Vitals reviewed.        LABS AND IMAGING  Office Visit on 10/30/2020   Component Date Value Ref Range Status   • Glucose " 10/30/2020 121  70 - 130 mg/dL Final   • Lot Number 10/30/2020 [id number]   Final   • Expiration Date 10/30/2020 06/02/2021   Final   • Hemoglobin A1C 10/30/2020 6.0  % Final   • Lot Number 10/30/2020 [id number]   Final   • Expiration Date 10/30/2020 08/17/2022   Final           Assessment  Assessment/Plan   Problems Addressed this Visit        Digestive    Vitamin D deficiency    Relevant Orders    Comprehensive Metabolic Panel    Vitamin D 25 Hydroxy    B12 deficiency    Relevant Medications    cyanocobalamin injection 1,000 mcg       Endocrine    Prediabetes - Primary    Relevant Orders    POC Glucose, Blood (Completed)    POC Glycosylated Hemoglobin (Hb A1C) (Completed)    Hypothyroidism    Relevant Medications    Thyroid (NP THYROID) 60 MG PO tablet    Other Relevant Orders    TSH    T4, Free      Diagnoses       Codes Comments    Prediabetes    -  Primary ICD-10-CM: R73.03  ICD-9-CM: 790.29     Hypothyroidism due to Hashimoto's thyroiditis     ICD-10-CM: E03.8, E06.3  ICD-9-CM: 244.8, 245.2     Vitamin D deficiency     ICD-10-CM: E55.9  ICD-9-CM: 268.9     B12 deficiency     ICD-10-CM: E53.8  ICD-9-CM: 266.2           Plan  -Synthroid 112 mcg changed to a trial of NP thyroid 60 mg. She would like to try for a couple of months to assess the clinical response.    -B12 injections  administered today. Cont sublingual daily.     -Vit D 5000 daily OTC supplements daily and q14 days 50 000 IU. Repeat levels today.     -Continue with lifestyle and diet. Doing well, A1C is stable.     -Vit D OTC daily dose continued.      -labs in 6-8 weeks    Follow-up in 4 months

## 2020-11-06 ENCOUNTER — TELEPHONE (OUTPATIENT)
Dept: ENDOCRINOLOGY | Facility: CLINIC | Age: 55
End: 2020-11-06

## 2020-11-06 RX ORDER — LEVOTHYROXINE AND LIOTHYRONINE 38; 9 UG/1; UG/1
60 TABLET ORAL DAILY
Qty: 30 TABLET | Refills: 11 | COMMUNITY
Start: 2020-11-06 | End: 2020-11-12 | Stop reason: SDUPTHER

## 2020-11-06 NOTE — TELEPHONE ENCOUNTER
Pt needs a refill of Thyroid (NP THYROID) 60 MG called into [facility].    [facility]  Ph [phone]  Fax [phone]

## 2020-11-12 RX ORDER — LEVOTHYROXINE AND LIOTHYRONINE 38; 9 UG/1; UG/1
60 TABLET ORAL DAILY
Qty: 30 TABLET | Refills: 11 | COMMUNITY
Start: 2020-11-12 | End: 2020-11-12

## 2020-11-12 RX ORDER — LEVOTHYROXINE AND LIOTHYRONINE 38; 9 UG/1; UG/1
60 TABLET ORAL DAILY
Qty: 30 TABLET | Refills: 4 | Status: SHIPPED | OUTPATIENT
Start: 2020-11-12 | End: 2021-04-28

## 2020-11-12 NOTE — TELEPHONE ENCOUNTER
Pt called never received her prescription for Thyroid np Thyroid 60 mg. Can you please resend to pharmacy. Pt last seen 10/30/20 no upcoming appt

## 2020-11-12 NOTE — TELEPHONE ENCOUNTER
Please resend RX.  Did not go through.  No receipt confirmed.      E-Prescribing Status    Outpatient Medication Detail    Thyroid (NP THYROID) 60 MG PO tablet        Sig: Take 1 tablet by mouth Daily.        Class: Sample        Route: Oral

## 2020-11-12 NOTE — TELEPHONE ENCOUNTER
Rx did not go through  NO receipt confirmed.       E-Prescribing Status    Outpatient Medication Detail    Thyroid (NP THYROID) 60 MG PO tablet        Sig: Take 1 tablet by mouth Daily.        Class: Sample        Route: Oral

## 2020-12-24 RX ORDER — LEVOTHYROXINE SODIUM 112 MCG
TABLET ORAL
Qty: 90 TABLET | Refills: 0 | Status: SHIPPED | OUTPATIENT
Start: 2020-12-24 | End: 2021-11-03

## 2021-04-30 RX ORDER — LEVOTHYROXINE, LIOTHYRONINE 38; 9 UG/1; UG/1
TABLET ORAL
Qty: 30 TABLET | Refills: 4 | Status: SHIPPED | OUTPATIENT
Start: 2021-04-30 | End: 2021-11-03 | Stop reason: SDUPTHER

## 2021-09-09 ENCOUNTER — LAB (OUTPATIENT)
Dept: LAB | Facility: HOSPITAL | Age: 56
End: 2021-09-09

## 2021-09-09 ENCOUNTER — TRANSCRIBE ORDERS (OUTPATIENT)
Dept: LAB | Facility: HOSPITAL | Age: 56
End: 2021-09-09

## 2021-09-09 DIAGNOSIS — E53.8 BIOTIN-(PROPIONYL-COA-CARBOXYLASE) LIGASE DEFICIENCY: ICD-10-CM

## 2021-09-09 DIAGNOSIS — R73.03 DIABETES MELLITUS, LATENT: ICD-10-CM

## 2021-09-09 DIAGNOSIS — E55.9 AVITAMINOSIS D: ICD-10-CM

## 2021-09-09 DIAGNOSIS — E66.9 LIFELONG OBESITY: ICD-10-CM

## 2021-09-09 DIAGNOSIS — I51.9 MYXEDEMA HEART DISEASE: ICD-10-CM

## 2021-09-09 DIAGNOSIS — E03.9 MYXEDEMA HEART DISEASE: ICD-10-CM

## 2021-09-09 DIAGNOSIS — I51.9 MYXEDEMA HEART DISEASE: Primary | ICD-10-CM

## 2021-09-09 DIAGNOSIS — E03.9 MYXEDEMA HEART DISEASE: Primary | ICD-10-CM

## 2021-09-09 LAB
DEPRECATED RDW RBC AUTO: 40.5 FL (ref 37–54)
ERYTHROCYTE [DISTWIDTH] IN BLOOD BY AUTOMATED COUNT: 13.7 % (ref 12.3–15.4)
HBA1C MFR BLD: 6.1 % (ref 4.8–5.6)
HCT VFR BLD AUTO: 40.5 % (ref 34–46.6)
HGB BLD-MCNC: 13.4 G/DL (ref 12–15.9)
MCH RBC QN AUTO: 27.2 PG (ref 26.6–33)
MCHC RBC AUTO-ENTMCNC: 33.1 G/DL (ref 31.5–35.7)
MCV RBC AUTO: 82.3 FL (ref 79–97)
PLATELET # BLD AUTO: 237 10*3/MM3 (ref 140–450)
PMV BLD AUTO: 11.7 FL (ref 6–12)
RBC # BLD AUTO: 4.92 10*6/MM3 (ref 3.77–5.28)
WBC # BLD AUTO: 4.89 10*3/MM3 (ref 3.4–10.8)

## 2021-09-09 PROCEDURE — 36415 COLL VENOUS BLD VENIPUNCTURE: CPT

## 2021-09-09 PROCEDURE — 85027 COMPLETE CBC AUTOMATED: CPT

## 2021-09-09 PROCEDURE — 85007 BL SMEAR W/DIFF WBC COUNT: CPT

## 2021-09-09 PROCEDURE — 80053 COMPREHEN METABOLIC PANEL: CPT

## 2021-09-09 PROCEDURE — 80061 LIPID PANEL: CPT

## 2021-09-09 PROCEDURE — 83036 HEMOGLOBIN GLYCOSYLATED A1C: CPT

## 2021-09-09 PROCEDURE — 82607 VITAMIN B-12: CPT

## 2021-09-09 PROCEDURE — 82306 VITAMIN D 25 HYDROXY: CPT

## 2021-09-10 LAB
25(OH)D3 SERPL-MCNC: 58.4 NG/ML (ref 30–100)
ALBUMIN SERPL-MCNC: 4.7 G/DL (ref 3.5–5.2)
ALBUMIN/GLOB SERPL: 1.7 G/DL
ALP SERPL-CCNC: 55 U/L (ref 39–117)
ALT SERPL W P-5'-P-CCNC: 25 U/L (ref 1–33)
ANION GAP SERPL CALCULATED.3IONS-SCNC: 10.3 MMOL/L (ref 5–15)
AST SERPL-CCNC: 17 U/L (ref 1–32)
BILIRUB SERPL-MCNC: 0.4 MG/DL (ref 0–1.2)
BUN SERPL-MCNC: 14 MG/DL (ref 6–20)
BUN/CREAT SERPL: 20.9 (ref 7–25)
CALCIUM SPEC-SCNC: 9.9 MG/DL (ref 8.6–10.5)
CHLORIDE SERPL-SCNC: 101 MMOL/L (ref 98–107)
CHOLEST SERPL-MCNC: 191 MG/DL (ref 0–200)
CO2 SERPL-SCNC: 24.7 MMOL/L (ref 22–29)
CREAT SERPL-MCNC: 0.67 MG/DL (ref 0.57–1)
GFR SERPL CREATININE-BSD FRML MDRD: 91 ML/MIN/1.73
GLOBULIN UR ELPH-MCNC: 2.7 GM/DL
GLUCOSE SERPL-MCNC: 93 MG/DL (ref 65–99)
HDLC SERPL-MCNC: 37 MG/DL (ref 40–60)
LDLC SERPL CALC-MCNC: 113 MG/DL (ref 0–100)
LDLC/HDLC SERPL: 2.89 {RATIO}
LYMPHOCYTES # BLD MANUAL: 1.38 10*3/MM3 (ref 0.7–3.1)
LYMPHOCYTES NFR BLD MANUAL: 28.3 % (ref 19.6–45.3)
LYMPHOCYTES NFR BLD MANUAL: 7.1 % (ref 5–12)
MONOCYTES # BLD AUTO: 0.35 10*3/MM3 (ref 0.1–0.9)
NEUTROPHILS # BLD AUTO: 3.16 10*3/MM3 (ref 1.7–7)
NEUTROPHILS NFR BLD MANUAL: 64.6 % (ref 42.7–76)
PLAT MORPH BLD: NORMAL
POTASSIUM SERPL-SCNC: 4.1 MMOL/L (ref 3.5–5.2)
PROT SERPL-MCNC: 7.4 G/DL (ref 6–8.5)
RBC MORPH BLD: NORMAL
SODIUM SERPL-SCNC: 136 MMOL/L (ref 136–145)
TRIGL SERPL-MCNC: 236 MG/DL (ref 0–150)
VIT B12 BLD-MCNC: 385 PG/ML (ref 211–946)
VLDLC SERPL-MCNC: 41 MG/DL (ref 5–40)
WBC MORPH BLD: NORMAL

## 2021-11-03 ENCOUNTER — TELEPHONE (OUTPATIENT)
Dept: ENDOCRINOLOGY | Facility: CLINIC | Age: 56
End: 2021-11-03

## 2021-11-03 ENCOUNTER — OFFICE VISIT (OUTPATIENT)
Dept: ENDOCRINOLOGY | Facility: CLINIC | Age: 56
End: 2021-11-03

## 2021-11-03 VITALS
HEART RATE: 95 BPM | OXYGEN SATURATION: 99 % | BODY MASS INDEX: 36.55 KG/M2 | SYSTOLIC BLOOD PRESSURE: 110 MMHG | HEIGHT: 65 IN | WEIGHT: 219.4 LBS | DIASTOLIC BLOOD PRESSURE: 74 MMHG

## 2021-11-03 DIAGNOSIS — E06.3 HYPOTHYROIDISM DUE TO HASHIMOTO'S THYROIDITIS: Primary | ICD-10-CM

## 2021-11-03 DIAGNOSIS — E03.8 HYPOTHYROIDISM DUE TO HASHIMOTO'S THYROIDITIS: Primary | ICD-10-CM

## 2021-11-03 DIAGNOSIS — E53.8 B12 DEFICIENCY: ICD-10-CM

## 2021-11-03 DIAGNOSIS — R73.03 PREDIABETES: ICD-10-CM

## 2021-11-03 DIAGNOSIS — E55.9 VITAMIN D DEFICIENCY: ICD-10-CM

## 2021-11-03 PROCEDURE — 99214 OFFICE O/P EST MOD 30 MIN: CPT | Performed by: INTERNAL MEDICINE

## 2021-11-03 PROCEDURE — 96372 THER/PROPH/DIAG INJ SC/IM: CPT | Performed by: INTERNAL MEDICINE

## 2021-11-03 RX ORDER — LEVOTHYROXINE SODIUM 0.12 MG/1
TABLET ORAL
COMMUNITY
Start: 2021-10-26 | End: 2021-11-03

## 2021-11-03 RX ORDER — LEVOTHYROXINE AND LIOTHYRONINE 38; 9 UG/1; UG/1
60 TABLET ORAL DAILY
Qty: 30 TABLET | Refills: 6 | Status: SHIPPED | OUTPATIENT
Start: 2021-11-03 | End: 2022-02-15 | Stop reason: SDUPTHER

## 2021-11-03 RX ORDER — CYANOCOBALAMIN 1000 UG/ML
1000 INJECTION, SOLUTION INTRAMUSCULAR; SUBCUTANEOUS
Status: SHIPPED | OUTPATIENT
Start: 2021-11-03

## 2021-11-03 RX ORDER — CYANOCOBALAMIN 1000 UG/ML
1000 INJECTION, SOLUTION INTRAMUSCULAR; SUBCUTANEOUS
Status: SHIPPED | OUTPATIENT
Start: 2021-11-03 | End: 2022-05-02

## 2021-11-03 RX ORDER — LEVOTHYROXINE SODIUM 25 MCG
25 TABLET ORAL DAILY
Qty: 30 TABLET | Refills: 6 | Status: SHIPPED | OUTPATIENT
Start: 2021-11-03 | End: 2021-12-17 | Stop reason: SDUPTHER

## 2021-11-03 RX ADMIN — CYANOCOBALAMIN 1000 MCG: 1000 INJECTION, SOLUTION INTRAMUSCULAR; SUBCUTANEOUS at 16:38

## 2021-11-03 NOTE — TELEPHONE ENCOUNTER
----- Message from Betty MAX MD sent at 11/3/2021 12:04 PM EDT -----  There were another labs that were done on 10/26/21, thyroid medication was changed after that. Could you please try getting those results? Or numbers of the thyroid from 10/26/2021

## 2021-11-03 NOTE — TELEPHONE ENCOUNTER
Called Dr Ramirez to request copy of Labs  Stated they would send message to nurse, and have them faxed

## 2021-11-03 NOTE — PROGRESS NOTES
Chief complaint  Hypothyroidism (Follow UP:  Ran out of Thyroid RX. Went to PCP,he was not comfortable perscribing NP Thyroid Changed thyroid RX to Levothyroixine  to 125mg.  Is having Sx of Hair Loss)    Subjective   Adeola Izaguirre is a 56 y.o. female is here today for follow-up.  Hypothyroidism: The patient is being seen for follow-up of hypothyroidism. The patient has Hashimoto's thyroiditis since age 17. Recent results: reviewed. Current treatment includes Synthroid 112 mcg. We have given a trial of NP thyroid 60 mg and back to levothyroxine 112 mcg since July 2021. She didn't feel significant difference on NP thyroid.   Symptoms: fatigue, weight gain, constipation, diarrhea, weakness, myalgias, arthralgias and peripheral edema. The patient is currently experiencing symptoms.    Prediabetes her A1C was stable and she is managing prediabetes with diet, avoids pasta, bread.     Vitamin D deficiency 50 000 IU every other week and 5000 IU daily. Levels were very low.      C/o back pain and fatigue. Her thyroid meds were changed several times since the last year and she is not sure if she felt better with NP thyroid or not. Labs and records from last year reviewed.       Medications    Current Outpatient Medications:   •  Cholecalciferol (VITAMIN D3) 5000 UNITS tablet, Take  by mouth., Disp: , Rfl:   •  ergocalciferol (ERGOCALCIFEROL) 02504 units capsule, Take 1 capsule by mouth 1 (One) Time Per Week., Disp: 4 capsule, Rfl: 5  •  latanoprost (XALATAN) 0.005 % ophthalmic solution, latanoprost 0.005 % eye drops, Disp: , Rfl:   •  Thyroid (NP THYROID) 60 MG PO tablet, Take 1 tablet by mouth Daily., Disp: 30 tablet, Rfl: 6  •  Synthroid 25 MCG tablet, Take 1 tablet by mouth Daily., Disp: 30 tablet, Rfl: 6    Current Facility-Administered Medications:   •  cyanocobalamin injection 1,000 mcg, 1,000 mcg, Intramuscular, Q30 Days, Betty Hernandez MD      Review of systems  Review of Systems   Constitutional: Positive for  "fatigue and unexpected weight change (weight gain). Negative for activity change, appetite change, chills and diaphoresis.   HENT: Negative for congestion, ear pain, facial swelling, hearing loss, postnasal drip, sore throat, trouble swallowing and voice change.    Eyes: Positive for itching. Negative for redness and visual disturbance.   Respiratory: Negative for cough and shortness of breath.    Cardiovascular: Positive for leg swelling. Negative for chest pain and palpitations.   Gastrointestinal: Positive for constipation. Negative for abdominal distention, abdominal pain, diarrhea, nausea and vomiting.   Endocrine:        As listed in HPI   Genitourinary: Negative.    Musculoskeletal: Positive for arthralgias, joint swelling and myalgias. Negative for back pain, neck pain and neck stiffness.   Skin: Negative.    Allergic/Immunologic: Negative.    Neurological: Negative for dizziness, tremors, syncope, weakness, light-headedness and headaches.   Hematological: Negative.    Psychiatric/Behavioral: Negative.    All other systems reviewed and are negative.      Physical exam  Objective   Blood pressure 110/74, pulse 95, height 165.1 cm (65\"), weight 99.5 kg (219 lb 6.4 oz), SpO2 99 %. Body mass index is 36.51 kg/m².  Physical Exam   Constitutional: She is oriented to person, place, and time. She appears well-developed.   HENT:   Head: Normocephalic and atraumatic.   Eyes: Conjunctivae are normal.   Neck: Carotid bruit is not present. No thyroid mass and no thyromegaly present.   The neck is supple and no assimetry visualized   Cardiovascular: Normal rate, regular rhythm and normal heart sounds.   Pulmonary/Chest: Effort normal and breath sounds normal.   Lymphadenopathy:     She has no cervical adenopathy.   Neurological: She is alert and oriented to person, place, and time.   Skin: Skin is warm. No rash noted.   Psychiatric: Thought content normal.   Vitals reviewed.        LABS AND IMAGING  No visits with results " within 1 Month(s) from this visit.   Latest known visit with results is:   Lab on 09/09/2021   Component Date Value Ref Range Status   • Glucose 09/09/2021 93  65 - 99 mg/dL Final   • BUN 09/09/2021 14  6 - 20 mg/dL Final   • Creatinine 09/09/2021 0.67  0.57 - 1.00 mg/dL Final   • Sodium 09/09/2021 136  136 - 145 mmol/L Final   • Potassium 09/09/2021 4.1  3.5 - 5.2 mmol/L Final   • Chloride 09/09/2021 101  98 - 107 mmol/L Final   • CO2 09/09/2021 24.7  22.0 - 29.0 mmol/L Final   • Calcium 09/09/2021 9.9  8.6 - 10.5 mg/dL Final   • Total Protein 09/09/2021 7.4  6.0 - 8.5 g/dL Final   • Albumin 09/09/2021 4.70  3.50 - 5.20 g/dL Final   • ALT (SGPT) 09/09/2021 25  1 - 33 U/L Final   • AST (SGOT) 09/09/2021 17  1 - 32 U/L Final   • Alkaline Phosphatase 09/09/2021 55  39 - 117 U/L Final   • Total Bilirubin 09/09/2021 0.4  0.0 - 1.2 mg/dL Final   • eGFR Non African Amer 09/09/2021 91  >60 mL/min/1.73 Final   • Globulin 09/09/2021 2.7  gm/dL Final   • A/G Ratio 09/09/2021 1.7  g/dL Final   • BUN/Creatinine Ratio 09/09/2021 20.9  7.0 - 25.0 Final   • Anion Gap 09/09/2021 10.3  5.0 - 15.0 mmol/L Final   • Hemoglobin A1C 09/09/2021 6.10* 4.80 - 5.60 % Final   • Total Cholesterol 09/09/2021 191  0 - 200 mg/dL Final   • Triglycerides 09/09/2021 236* 0 - 150 mg/dL Final   • HDL Cholesterol 09/09/2021 37* 40 - 60 mg/dL Final   • LDL Cholesterol  09/09/2021 113* 0 - 100 mg/dL Final   • VLDL Cholesterol 09/09/2021 41* 5 - 40 mg/dL Final   • LDL/HDL Ratio 09/09/2021 2.89   Final   • Vitamin B-12 09/09/2021 385  211 - 946 pg/mL Final   • 25 Hydroxy, Vitamin D 09/09/2021 58.4  30.0 - 100.0 ng/ml Final   • WBC 09/09/2021 4.89  3.40 - 10.80 10*3/mm3 Final   • RBC 09/09/2021 4.92  3.77 - 5.28 10*6/mm3 Final   • Hemoglobin 09/09/2021 13.4  12.0 - 15.9 g/dL Final   • Hematocrit 09/09/2021 40.5  34.0 - 46.6 % Final   • MCV 09/09/2021 82.3  79.0 - 97.0 fL Final   • MCH 09/09/2021 27.2  26.6 - 33.0 pg Final   • MCHC 09/09/2021 33.1  31.5 -  35.7 g/dL Final   • RDW 09/09/2021 13.7  12.3 - 15.4 % Final   • RDW-SD 09/09/2021 40.5  37.0 - 54.0 fl Final   • MPV 09/09/2021 11.7  6.0 - 12.0 fL Final   • Platelets 09/09/2021 237  140 - 450 10*3/mm3 Final   • Neutrophil % 09/09/2021 64.6  42.7 - 76.0 % Final   • Lymphocyte % 09/09/2021 28.3  19.6 - 45.3 % Final   • Monocyte % 09/09/2021 7.1  5.0 - 12.0 % Final   • Neutrophils Absolute 09/09/2021 3.16  1.70 - 7.00 10*3/mm3 Final   • Lymphocytes Absolute 09/09/2021 1.38  0.70 - 3.10 10*3/mm3 Final   • Monocytes Absolute 09/09/2021 0.35  0.10 - 0.90 10*3/mm3 Final   • RBC Morphology 09/09/2021 Normal  Normal Final   • WBC Morphology 09/09/2021 Normal  Normal Final   • Platelet Morphology 09/09/2021 Normal  Normal Final           Assessment  Assessment/Plan   Problems Addressed this Visit        Other    Vitamin D deficiency    Prediabetes    Hypothyroidism - Primary    Relevant Medications    Thyroid (NP THYROID) 60 MG PO tablet    Synthroid 25 MCG tablet    Other Relevant Orders    TSH    T4, Free    T3    B12 deficiency    Relevant Medications    cyanocobalamin injection 1,000 mcg      Diagnoses       Codes Comments    Hypothyroidism due to Hashimoto's thyroiditis    -  Primary ICD-10-CM: E03.8, E06.3  ICD-9-CM: 244.8, 245.2     Vitamin D deficiency     ICD-10-CM: E55.9  ICD-9-CM: 268.9     Prediabetes     ICD-10-CM: R73.03  ICD-9-CM: 790.29     B12 deficiency     ICD-10-CM: E53.8  ICD-9-CM: 266.2           Plan  - Synthroid 25 mcg with NP thyroid 60 mg trial given.  She would like to get another trial of the NP thyroid medication.   The levels in 10/26 were low, will add T4 to the previous NP thyroid dose  -repeat labs in 6 weeks  -- follow-up in 12 weeks.      -B12 continue sublingual med.  Injection administered today. We will continue monthly injections for now.     -Vit D 5000 daily OTC supplements daily and q14 days 50 000 IU. Repeat levels today.     -Vit D OTC daily dose continued.      Follow-up in 4  months

## 2021-12-15 ENCOUNTER — LAB (OUTPATIENT)
Dept: LAB | Facility: HOSPITAL | Age: 56
End: 2021-12-15

## 2021-12-15 ENCOUNTER — LAB (OUTPATIENT)
Dept: ENDOCRINOLOGY | Facility: CLINIC | Age: 56
End: 2021-12-15

## 2021-12-15 DIAGNOSIS — E53.8 B12 DEFICIENCY: Primary | ICD-10-CM

## 2021-12-15 PROCEDURE — 84439 ASSAY OF FREE THYROXINE: CPT | Performed by: INTERNAL MEDICINE

## 2021-12-15 PROCEDURE — 84443 ASSAY THYROID STIM HORMONE: CPT | Performed by: INTERNAL MEDICINE

## 2021-12-15 PROCEDURE — 82306 VITAMIN D 25 HYDROXY: CPT | Performed by: INTERNAL MEDICINE

## 2021-12-15 RX ORDER — CYANOCOBALAMIN 1000 UG/ML
1000 INJECTION, SOLUTION INTRAMUSCULAR; SUBCUTANEOUS
Status: SHIPPED | OUTPATIENT
Start: 2021-12-15

## 2021-12-16 ENCOUNTER — TELEPHONE (OUTPATIENT)
Dept: ENDOCRINOLOGY | Facility: CLINIC | Age: 56
End: 2021-12-16

## 2021-12-16 LAB
25(OH)D3 SERPL-MCNC: 38 NG/ML (ref 30–100)
T4 FREE SERPL-MCNC: 0.83 NG/DL (ref 0.93–1.7)
TSH SERPL DL<=0.05 MIU/L-ACNC: 3.58 UIU/ML (ref 0.27–4.2)

## 2021-12-17 RX ORDER — LEVOTHYROXINE SODIUM 25 MCG
25 TABLET ORAL DAILY
Qty: 30 TABLET | Refills: 6 | Status: SHIPPED | OUTPATIENT
Start: 2021-12-17 | End: 2022-02-15

## 2022-02-11 ENCOUNTER — LAB (OUTPATIENT)
Dept: LAB | Facility: HOSPITAL | Age: 57
End: 2022-02-11

## 2022-02-11 ENCOUNTER — LAB (OUTPATIENT)
Dept: ENDOCRINOLOGY | Facility: CLINIC | Age: 57
End: 2022-02-11

## 2022-02-11 ENCOUNTER — CLINICAL SUPPORT (OUTPATIENT)
Dept: ENDOCRINOLOGY | Facility: CLINIC | Age: 57
End: 2022-02-11

## 2022-02-11 DIAGNOSIS — E03.8 HYPOTHYROIDISM DUE TO HASHIMOTO'S THYROIDITIS: ICD-10-CM

## 2022-02-11 DIAGNOSIS — E06.3 HYPOTHYROIDISM DUE TO HASHIMOTO'S THYROIDITIS: ICD-10-CM

## 2022-02-11 LAB
T3 SERPL-MCNC: 163 NG/DL (ref 80–200)
T4 FREE SERPL-MCNC: 1.24 NG/DL (ref 0.93–1.7)
TSH SERPL DL<=0.05 MIU/L-ACNC: 0.23 UIU/ML (ref 0.27–4.2)

## 2022-02-11 PROCEDURE — 84443 ASSAY THYROID STIM HORMONE: CPT | Performed by: INTERNAL MEDICINE

## 2022-02-11 PROCEDURE — 84439 ASSAY OF FREE THYROXINE: CPT | Performed by: INTERNAL MEDICINE

## 2022-02-11 PROCEDURE — 84480 ASSAY TRIIODOTHYRONINE (T3): CPT | Performed by: INTERNAL MEDICINE

## 2022-02-15 ENCOUNTER — OFFICE VISIT (OUTPATIENT)
Dept: ENDOCRINOLOGY | Facility: CLINIC | Age: 57
End: 2022-02-15

## 2022-02-15 VITALS
BODY MASS INDEX: 36.34 KG/M2 | WEIGHT: 218.1 LBS | HEART RATE: 86 BPM | SYSTOLIC BLOOD PRESSURE: 115 MMHG | HEIGHT: 65 IN | DIASTOLIC BLOOD PRESSURE: 70 MMHG | OXYGEN SATURATION: 98 %

## 2022-02-15 DIAGNOSIS — E53.8 B12 DEFICIENCY: ICD-10-CM

## 2022-02-15 DIAGNOSIS — E03.8 HYPOTHYROIDISM DUE TO HASHIMOTO'S THYROIDITIS: Primary | ICD-10-CM

## 2022-02-15 DIAGNOSIS — E06.3 HYPOTHYROIDISM DUE TO HASHIMOTO'S THYROIDITIS: Primary | ICD-10-CM

## 2022-02-15 DIAGNOSIS — R73.03 PREDIABETES: ICD-10-CM

## 2022-02-15 DIAGNOSIS — E55.9 VITAMIN D DEFICIENCY: ICD-10-CM

## 2022-02-15 PROCEDURE — 99214 OFFICE O/P EST MOD 30 MIN: CPT | Performed by: INTERNAL MEDICINE

## 2022-02-15 RX ORDER — LEVOTHYROXINE AND LIOTHYRONINE 38; 9 UG/1; UG/1
60 TABLET ORAL DAILY
Qty: 90 TABLET | Refills: 3 | Status: SHIPPED | OUTPATIENT
Start: 2022-02-15 | End: 2023-03-27

## 2022-02-15 RX ORDER — LEVOTHYROXINE SODIUM 50 MCG
50 TABLET ORAL DAILY
Qty: 90 TABLET | Refills: 3 | Status: SHIPPED | OUTPATIENT
Start: 2022-02-15 | End: 2022-12-15

## 2022-02-15 NOTE — PROGRESS NOTES
Chief complaint  Hypothyroidism (Follow UP)    Subjective   Adeola Izaguirre is a 56 y.o. female is here today for follow-up.  Hypothyroidism: The patient is being seen for follow-up of hypothyroidism. The patient has Hashimoto's thyroiditis since age 17. Recent results: reviewed. Current treatment includes NP thyroid 60 mg and Synthroid 50 mcg .   She reported feeling better on current regimen.     Prediabetes her A1C was stable and she is managing prediabetes with diet, avoids pasta, bread.     Vitamin D deficiency 50 000 IU every other week and 5000 IU daily. Levels were very low.      C/o back pain and fatigue. Her thyroid meds were changed several times since the last year. She reported feeling better after the change in doses. No palpitations, insomnia or BP elevations.       Medications    Current Outpatient Medications:   •  Cholecalciferol (VITAMIN D3) 5000 UNITS tablet, Take  by mouth., Disp: , Rfl:   •  ergocalciferol (ERGOCALCIFEROL) 58176 units capsule, Take 1 capsule by mouth 1 (One) Time Per Week., Disp: 4 capsule, Rfl: 5  •  latanoprost (XALATAN) 0.005 % ophthalmic solution, latanoprost 0.005 % eye drops, Disp: , Rfl:   •  Synthroid 25 MCG tablet, Take 1 tablet by mouth Daily., Disp: 30 tablet, Rfl: 6  •  Thyroid (NP THYROID) 60 MG PO tablet, Take 1 tablet by mouth Daily., Disp: 30 tablet, Rfl: 6    Current Facility-Administered Medications:   •  cyanocobalamin injection 1,000 mcg, 1,000 mcg, Intramuscular, Q30 Days, Betty Hernandez MD  •  cyanocobalamin injection 1,000 mcg, 1,000 mcg, Intramuscular, Q28 Days, Betty Hernandez MD, 1,000 mcg at 11/03/21 1638  •  cyanocobalamin injection 1,000 mcg, 1,000 mcg, Intramuscular, Q28 Days, Betty Hernandez MD      Review of systems  Review of Systems   Constitutional: Positive for fatigue and unexpected weight change (weight gain). Negative for activity change, appetite change, chills and diaphoresis.   HENT: Negative for congestion, ear pain, facial  "swelling, hearing loss, postnasal drip, sore throat, trouble swallowing and voice change.    Eyes: Positive for itching. Negative for redness and visual disturbance.   Respiratory: Negative for cough and shortness of breath.    Cardiovascular: Positive for leg swelling. Negative for chest pain and palpitations.   Gastrointestinal: Positive for constipation. Negative for abdominal distention, abdominal pain, diarrhea, nausea and vomiting.   Endocrine:        As listed in HPI   Genitourinary: Negative.    Musculoskeletal: Positive for arthralgias, joint swelling and myalgias. Negative for back pain, neck pain and neck stiffness.   Skin: Negative.    Allergic/Immunologic: Negative.    Neurological: Negative for dizziness, tremors, syncope, weakness, light-headedness and headaches.   Hematological: Negative.    Psychiatric/Behavioral: Negative.    All other systems reviewed and are negative.      Physical exam  Objective   Blood pressure 115/70, pulse 86, height 165.1 cm (65\"), weight 98.9 kg (218 lb 1.6 oz), SpO2 98 %. Body mass index is 36.29 kg/m².  Physical Exam   Constitutional: She is oriented to person, place, and time. She appears well-developed.   HENT:   Head: Normocephalic and atraumatic.   Eyes: Conjunctivae are normal.   Neck: Carotid bruit is not present. No thyroid mass and no thyromegaly present.   Cardiovascular: Normal rate, regular rhythm and normal heart sounds.   Pulmonary/Chest: Effort normal and breath sounds normal.   Neurological: She is alert and oriented to person, place, and time.   Skin: Skin is warm. No rash noted.   Psychiatric: Thought content normal.   Vitals reviewed.        LABS AND IMAGING  Lab on 02/11/2022   Component Date Value Ref Range Status   • TSH 02/11/2022 0.232* 0.270 - 4.200 uIU/mL Final   • Free T4 02/11/2022 1.24  0.93 - 1.70 ng/dL Final   • T3, Total 02/11/2022 163.0  80.0 - 200.0 ng/dl Final           Assessment  Assessment/Plan   Problems Addressed this Visit        " Other    Vitamin D deficiency    Hypothyroidism - Primary      Diagnoses       Codes Comments    Hypothyroidism due to Hashimoto's thyroiditis    -  Primary ICD-10-CM: E03.8, E06.3  ICD-9-CM: 244.8, 245.2     Vitamin D deficiency     ICD-10-CM: E55.9  ICD-9-CM: 268.9           Plan  - Synthroid 50 mcg with NP thyroid 60 mg    Recent labs reviewed. TSH is 0.2. She is feeling better on current medications, continue the same and repeat labs in 2-3 months.      -B12 continue sublingual med.  Injection administered last week. We will continue monthly injections for now.     -Vit D 5000 daily OTC supplements daily and q14 days 50 000 IU. Repeat levels today.       Follow-up in 4 months

## 2022-04-13 ENCOUNTER — LAB (OUTPATIENT)
Dept: ENDOCRINOLOGY | Facility: CLINIC | Age: 57
End: 2022-04-13

## 2022-04-13 ENCOUNTER — LAB (OUTPATIENT)
Dept: LAB | Facility: HOSPITAL | Age: 57
End: 2022-04-13

## 2022-04-13 DIAGNOSIS — E06.3 HYPOTHYROIDISM DUE TO HASHIMOTO'S THYROIDITIS: ICD-10-CM

## 2022-04-13 DIAGNOSIS — E03.8 HYPOTHYROIDISM DUE TO HASHIMOTO'S THYROIDITIS: ICD-10-CM

## 2022-04-13 PROCEDURE — 84439 ASSAY OF FREE THYROXINE: CPT

## 2022-04-13 PROCEDURE — 84443 ASSAY THYROID STIM HORMONE: CPT

## 2022-04-13 PROCEDURE — 84480 ASSAY TRIIODOTHYRONINE (T3): CPT

## 2022-04-14 LAB
T3 SERPL-MCNC: 165 NG/DL (ref 80–200)
T4 FREE SERPL-MCNC: 1.37 NG/DL (ref 0.93–1.7)
TSH SERPL DL<=0.05 MIU/L-ACNC: 0.18 UIU/ML (ref 0.27–4.2)

## 2022-05-04 ENCOUNTER — TELEPHONE (OUTPATIENT)
Dept: ENDOCRINOLOGY | Facility: CLINIC | Age: 57
End: 2022-05-04

## 2022-05-04 DIAGNOSIS — E06.3 HYPOTHYROIDISM DUE TO HASHIMOTO'S THYROIDITIS: ICD-10-CM

## 2022-05-04 DIAGNOSIS — E83.52 HYPERCALCEMIA: ICD-10-CM

## 2022-05-04 DIAGNOSIS — E03.8 HYPOTHYROIDISM DUE TO HASHIMOTO'S THYROIDITIS: ICD-10-CM

## 2022-05-04 DIAGNOSIS — R73.03 PREDIABETES: ICD-10-CM

## 2022-05-04 DIAGNOSIS — E53.8 B12 DEFICIENCY: Primary | ICD-10-CM

## 2022-05-04 DIAGNOSIS — E55.9 VITAMIN D DEFICIENCY: ICD-10-CM

## 2022-05-04 NOTE — TELEPHONE ENCOUNTER
Patient called requesting a call back from Dr. Hernandez's nurse about her lab work she had done on 4/13/22. Please advise.

## 2022-05-04 NOTE — TELEPHONE ENCOUNTER
It was sent as a results note.   Your thyroid levels are slightly higher than normal range. We can continue the same dose for now and reassess at your next visit. If you experience palpitations or anxiety, we can reduce the dose of thyroid medication.

## 2022-05-04 NOTE — TELEPHONE ENCOUNTER
PT notified of results expressed understanding, Requested labs ordered prior to her upcoming appt in July,.  I will place the orders.

## 2022-06-09 ENCOUNTER — CLINICAL SUPPORT (OUTPATIENT)
Dept: ENDOCRINOLOGY | Facility: CLINIC | Age: 57
End: 2022-06-09

## 2022-06-09 DIAGNOSIS — E53.8 B12 DEFICIENCY: ICD-10-CM

## 2022-07-21 ENCOUNTER — LAB (OUTPATIENT)
Dept: LAB | Facility: HOSPITAL | Age: 57
End: 2022-07-21

## 2022-07-21 ENCOUNTER — OFFICE VISIT (OUTPATIENT)
Dept: ENDOCRINOLOGY | Facility: CLINIC | Age: 57
End: 2022-07-21

## 2022-07-21 VITALS
HEART RATE: 68 BPM | DIASTOLIC BLOOD PRESSURE: 74 MMHG | BODY MASS INDEX: 36.67 KG/M2 | SYSTOLIC BLOOD PRESSURE: 114 MMHG | OXYGEN SATURATION: 98 % | WEIGHT: 220.1 LBS | HEIGHT: 65 IN

## 2022-07-21 DIAGNOSIS — R73.03 PREDIABETES: ICD-10-CM

## 2022-07-21 DIAGNOSIS — E53.8 B12 DEFICIENCY: Primary | ICD-10-CM

## 2022-07-21 DIAGNOSIS — E03.8 HYPOTHYROIDISM DUE TO HASHIMOTO'S THYROIDITIS: Primary | ICD-10-CM

## 2022-07-21 DIAGNOSIS — E66.9 LIFELONG OBESITY: ICD-10-CM

## 2022-07-21 DIAGNOSIS — E55.9 VITAMIN D DEFICIENCY: ICD-10-CM

## 2022-07-21 DIAGNOSIS — E06.3 HYPOTHYROIDISM DUE TO HASHIMOTO'S THYROIDITIS: Primary | ICD-10-CM

## 2022-07-21 DIAGNOSIS — E03.9 MYXEDEMA HEART DISEASE: ICD-10-CM

## 2022-07-21 DIAGNOSIS — Z13.820 ENCOUNTER FOR SCREENING FOR OSTEOPOROSIS: ICD-10-CM

## 2022-07-21 DIAGNOSIS — E06.3 HYPOTHYROIDISM DUE TO HASHIMOTO'S THYROIDITIS: ICD-10-CM

## 2022-07-21 DIAGNOSIS — I51.9 MYXEDEMA HEART DISEASE: ICD-10-CM

## 2022-07-21 DIAGNOSIS — E55.9 AVITAMINOSIS D: ICD-10-CM

## 2022-07-21 DIAGNOSIS — E03.8 HYPOTHYROIDISM DUE TO HASHIMOTO'S THYROIDITIS: ICD-10-CM

## 2022-07-21 DIAGNOSIS — R73.03 DIABETES MELLITUS, LATENT: ICD-10-CM

## 2022-07-21 PROCEDURE — 99214 OFFICE O/P EST MOD 30 MIN: CPT | Performed by: INTERNAL MEDICINE

## 2022-07-21 RX ORDER — CYANOCOBALAMIN 1000 UG/ML
1000 INJECTION, SOLUTION INTRAMUSCULAR; SUBCUTANEOUS
Status: SHIPPED | OUTPATIENT
Start: 2022-07-21

## 2022-07-21 RX ORDER — CYCLOBENZAPRINE HCL 10 MG
TABLET ORAL
COMMUNITY
Start: 2022-05-13 | End: 2022-12-15

## 2022-07-21 RX ORDER — GABAPENTIN 100 MG/1
CAPSULE ORAL
COMMUNITY
Start: 2022-05-24

## 2022-07-21 RX ORDER — GABAPENTIN 300 MG/1
CAPSULE ORAL
COMMUNITY
Start: 2022-05-12

## 2022-07-21 NOTE — PROGRESS NOTES
Chief complaint  Hypothyroidism (Follow up)    Subjective   Adeola Izaguirre is a 57 y.o. female is here today for follow-up.  Hypothyroidism: The patient is being seen for follow-up of hypothyroidism. The patient has Hashimoto's thyroiditis since age 17. Recent results: reviewed. Current treatment includes NP thyroid 60 mg and Synthroid 50 mcg .   She reported feeling better on current regimen.     Prediabetes her A1C was stable and she is managing prediabetes with diet, avoids pasta, bread.     Vitamin D deficiency 50 000 IU every other week and 5000 IU twice a week. Levels were very low.      C/o back pain and fatigue. Sx are worse after surgery in 4/2022.       Medications    Current Outpatient Medications:   •  Cholecalciferol (VITAMIN D3) 5000 UNITS tablet, Take  by mouth., Disp: , Rfl:   •  cyclobenzaprine (FLEXERIL) 10 MG tablet, cyclobenzaprine 10 mg tablet, Disp: , Rfl:   •  ergocalciferol (ERGOCALCIFEROL) 53481 units capsule, Take 1 capsule by mouth 1 (One) Time Per Week., Disp: 4 capsule, Rfl: 5  •  gabapentin (NEURONTIN) 100 MG capsule, gabapentin 100 mg capsule  TAKE 1-2 CAPSULES BY MOUTH THREE TIMES DAILY, Disp: , Rfl:   •  gabapentin (NEURONTIN) 300 MG capsule, gabapentin 300 mg capsule, Disp: , Rfl:   •  latanoprost (XALATAN) 0.005 % ophthalmic solution, latanoprost 0.005 % eye drops, Disp: , Rfl:   •  Synthroid 50 MCG tablet, Take 1 tablet by mouth Daily., Disp: 90 tablet, Rfl: 3  •  Thyroid (NP THYROID) 60 MG PO tablet, Take 1 tablet by mouth Daily., Disp: 90 tablet, Rfl: 3    Current Facility-Administered Medications:   •  cyanocobalamin injection 1,000 mcg, 1,000 mcg, Intramuscular, Q28 Days, Betty Hernandez MD, 1,000 mcg at 11/03/21 1638  •  cyanocobalamin injection 1,000 mcg, 1,000 mcg, Intramuscular, Q28 Days, Betty Hernandez MD  •  cyanocobalamin injection 1,000 mcg, 1,000 mcg, Intramuscular, Q28 Days, Betty Hernandez MD  •  cyanocobalamin injection 1,000 mcg, 1,000 mcg, Intramuscular, Q28  "David Hearn Anna V, MD      Review of systems  Review of Systems   Constitutional: Positive for fatigue and unexpected weight change (weight gain). Negative for activity change, appetite change, chills and diaphoresis.   HENT: Negative for congestion, ear pain, facial swelling, hearing loss, postnasal drip, sore throat, trouble swallowing and voice change.    Eyes: Positive for itching. Negative for redness and visual disturbance.   Respiratory: Negative for cough and shortness of breath.    Cardiovascular: Positive for leg swelling. Negative for chest pain and palpitations.   Gastrointestinal: Positive for constipation. Negative for abdominal distention, abdominal pain, diarrhea, nausea and vomiting.   Genitourinary: Negative.    Musculoskeletal: Positive for arthralgias, joint swelling and myalgias. Negative for back pain, neck pain and neck stiffness.   Skin: Negative.    Allergic/Immunologic: Negative.    Neurological: Negative for dizziness, tremors, syncope, weakness, light-headedness and headaches.   Hematological: Negative.    Psychiatric/Behavioral: Negative.    All other systems reviewed and are negative.      Physical exam  Objective   Blood pressure 114/74, pulse 68, height 165.1 cm (65\"), weight 99.8 kg (220 lb 1.6 oz), SpO2 98 %. Body mass index is 36.63 kg/m².  Physical Exam   Constitutional: She is oriented to person, place, and time. She appears well-developed.   HENT:   Head: Normocephalic and atraumatic.   Eyes: Conjunctivae are normal.   Neck: Carotid bruit is not present. No thyroid mass and no thyromegaly present.   Cardiovascular: Normal rate, regular rhythm and normal heart sounds.   Pulmonary/Chest: Effort normal and breath sounds normal.   Neurological: She is alert and oriented to person, place, and time.   Skin: Skin is warm. No rash noted.   Psychiatric: Thought content normal.   Vitals reviewed.        LABS AND IMAGING  No visits with results within 1 Month(s) from this visit. "   Latest known visit with results is:   Lab on 04/13/2022   Component Date Value Ref Range Status   • TSH 04/13/2022 0.182 (A) 0.270 - 4.200 uIU/mL Final   • Free T4 04/13/2022 1.37  0.93 - 1.70 ng/dL Final   • T3, Total 04/13/2022 165.0  80.0 - 200.0 ng/dl Final           Assessment  Assessment & Plan   Problems Addressed this Visit        Other    Vitamin D deficiency    Prediabetes    Hypothyroidism    B12 deficiency - Primary    Relevant Medications    cyanocobalamin injection 1,000 mcg      Other Visit Diagnoses     Encounter for screening for osteoporosis        Relevant Orders    DEXA Bone Density Axial      Diagnoses       Codes Comments    B12 deficiency    -  Primary ICD-10-CM: E53.8  ICD-9-CM: 266.2     Vitamin D deficiency     ICD-10-CM: E55.9  ICD-9-CM: 268.9     Hypothyroidism due to Hashimoto's thyroiditis     ICD-10-CM: E03.8, E06.3  ICD-9-CM: 244.8, 245.2     Prediabetes     ICD-10-CM: R73.03  ICD-9-CM: 790.29     Encounter for screening for osteoporosis     ICD-10-CM: Z13.820  ICD-9-CM: V82.81           Plan  - Synthroid 50 mcg with NP thyroid 60 mg    Labs today - thyroid, Vit D etc  I have also ordered BMD to eval for osteoporosis      -B12 continue sublingual med.  Injection administered last week. We will continue monthly injections for now. Recheck labs.     -Vit D 5000 daily OTC supplements daily and q14 days 50 000 IU. Repeat levels today.       Follow-up in 4 months

## 2022-08-25 ENCOUNTER — APPOINTMENT (OUTPATIENT)
Dept: BONE DENSITY | Facility: HOSPITAL | Age: 57
End: 2022-08-25

## 2022-10-19 ENCOUNTER — CLINICAL SUPPORT (OUTPATIENT)
Dept: ENDOCRINOLOGY | Facility: CLINIC | Age: 57
End: 2022-10-19

## 2022-10-19 ENCOUNTER — HOSPITAL ENCOUNTER (OUTPATIENT)
Dept: BONE DENSITY | Facility: HOSPITAL | Age: 57
Discharge: HOME | End: 2022-10-19
Admitting: INTERNAL MEDICINE

## 2022-10-19 DIAGNOSIS — E53.8 B12 DEFICIENCY: Primary | ICD-10-CM

## 2022-10-19 DIAGNOSIS — Z13.820 ENCOUNTER FOR SCREENING FOR OSTEOPOROSIS: ICD-10-CM

## 2022-10-19 PROCEDURE — 96372 THER/PROPH/DIAG INJ SC/IM: CPT | Performed by: INTERNAL MEDICINE

## 2022-10-19 PROCEDURE — 77080 DXA BONE DENSITY AXIAL: CPT

## 2022-10-19 RX ADMIN — CYANOCOBALAMIN 1000 MCG: 1000 INJECTION, SOLUTION INTRAMUSCULAR; SUBCUTANEOUS at 11:10

## 2022-12-08 RX ORDER — SODIUM, POTASSIUM,MAG SULFATES 17.5-3.13G
SOLUTION, RECONSTITUTED, ORAL ORAL
Qty: 354 ML | Refills: 0 | Status: SHIPPED | OUTPATIENT
Start: 2022-12-08

## 2022-12-13 ENCOUNTER — LAB (OUTPATIENT)
Dept: LAB | Facility: HOSPITAL | Age: 57
End: 2022-12-13

## 2022-12-13 DIAGNOSIS — E55.9 AVITAMINOSIS D: ICD-10-CM

## 2022-12-13 DIAGNOSIS — E03.8 HYPOTHYROIDISM DUE TO HASHIMOTO'S THYROIDITIS: ICD-10-CM

## 2022-12-13 DIAGNOSIS — I51.9 MYXEDEMA HEART DISEASE: ICD-10-CM

## 2022-12-13 DIAGNOSIS — R73.03 DIABETES MELLITUS, LATENT: ICD-10-CM

## 2022-12-13 DIAGNOSIS — E06.3 HYPOTHYROIDISM DUE TO HASHIMOTO'S THYROIDITIS: ICD-10-CM

## 2022-12-13 DIAGNOSIS — E03.9 MYXEDEMA HEART DISEASE: ICD-10-CM

## 2022-12-13 DIAGNOSIS — E66.9 LIFELONG OBESITY: ICD-10-CM

## 2022-12-13 LAB
25(OH)D3 SERPL-MCNC: 28.5 NG/ML (ref 30–100)
ALBUMIN SERPL-MCNC: 4.4 G/DL (ref 3.5–5.2)
ALBUMIN/GLOB SERPL: 1.6 G/DL
ALP SERPL-CCNC: 64 U/L (ref 39–117)
ALT SERPL W P-5'-P-CCNC: 27 U/L (ref 1–33)
ANION GAP SERPL CALCULATED.3IONS-SCNC: 10.3 MMOL/L (ref 5–15)
AST SERPL-CCNC: 22 U/L (ref 1–32)
BILIRUB SERPL-MCNC: 0.3 MG/DL (ref 0–1.2)
BUN SERPL-MCNC: 12 MG/DL (ref 6–20)
BUN/CREAT SERPL: 16.7 (ref 7–25)
CALCIUM SPEC-SCNC: 9.9 MG/DL (ref 8.6–10.5)
CHLORIDE SERPL-SCNC: 107 MMOL/L (ref 98–107)
CO2 SERPL-SCNC: 25.7 MMOL/L (ref 22–29)
CREAT SERPL-MCNC: 0.72 MG/DL (ref 0.57–1)
EGFRCR SERPLBLD CKD-EPI 2021: 97.1 ML/MIN/1.73
GLOBULIN UR ELPH-MCNC: 2.7 GM/DL
GLUCOSE SERPL-MCNC: 102 MG/DL (ref 65–99)
HBA1C MFR BLD: 6.4 % (ref 4.8–5.6)
POTASSIUM SERPL-SCNC: 4.7 MMOL/L (ref 3.5–5.2)
PROT SERPL-MCNC: 7.1 G/DL (ref 6–8.5)
SODIUM SERPL-SCNC: 143 MMOL/L (ref 136–145)
T4 FREE SERPL-MCNC: 1.1 NG/DL (ref 0.93–1.7)
TSH SERPL DL<=0.05 MIU/L-ACNC: 0.2 UIU/ML (ref 0.27–4.2)
VIT B12 BLD-MCNC: 490 PG/ML (ref 211–946)

## 2022-12-13 PROCEDURE — 82306 VITAMIN D 25 HYDROXY: CPT

## 2022-12-13 PROCEDURE — 80053 COMPREHEN METABOLIC PANEL: CPT

## 2022-12-13 PROCEDURE — 83036 HEMOGLOBIN GLYCOSYLATED A1C: CPT

## 2022-12-13 PROCEDURE — 84443 ASSAY THYROID STIM HORMONE: CPT

## 2022-12-13 PROCEDURE — 84439 ASSAY OF FREE THYROXINE: CPT

## 2022-12-13 PROCEDURE — 82607 VITAMIN B-12: CPT

## 2022-12-15 ENCOUNTER — OFFICE VISIT (OUTPATIENT)
Dept: ENDOCRINOLOGY | Facility: CLINIC | Age: 57
End: 2022-12-15

## 2022-12-15 VITALS
OXYGEN SATURATION: 98 % | BODY MASS INDEX: 36.72 KG/M2 | HEART RATE: 83 BPM | SYSTOLIC BLOOD PRESSURE: 118 MMHG | WEIGHT: 220.4 LBS | DIASTOLIC BLOOD PRESSURE: 74 MMHG | HEIGHT: 65 IN

## 2022-12-15 DIAGNOSIS — E03.8 HYPOTHYROIDISM DUE TO HASHIMOTO'S THYROIDITIS: ICD-10-CM

## 2022-12-15 DIAGNOSIS — E06.3 HYPOTHYROIDISM DUE TO HASHIMOTO'S THYROIDITIS: ICD-10-CM

## 2022-12-15 DIAGNOSIS — R73.03 PREDIABETES: Primary | ICD-10-CM

## 2022-12-15 DIAGNOSIS — E53.8 B12 DEFICIENCY: ICD-10-CM

## 2022-12-15 DIAGNOSIS — E55.9 VITAMIN D DEFICIENCY: ICD-10-CM

## 2022-12-15 LAB
EXPIRATION DATE: NORMAL
EXPIRATION DATE: NORMAL
GLUCOSE BLDC GLUCOMTR-MCNC: 127 MG/DL (ref 70–130)
HBA1C MFR BLD: 6.3 %
Lab: NORMAL
Lab: NORMAL

## 2022-12-15 PROCEDURE — 99214 OFFICE O/P EST MOD 30 MIN: CPT | Performed by: INTERNAL MEDICINE

## 2022-12-15 PROCEDURE — 82947 ASSAY GLUCOSE BLOOD QUANT: CPT | Performed by: INTERNAL MEDICINE

## 2022-12-15 PROCEDURE — 83036 HEMOGLOBIN GLYCOSYLATED A1C: CPT | Performed by: INTERNAL MEDICINE

## 2022-12-15 RX ORDER — CYANOCOBALAMIN 1000 UG/ML
1000 INJECTION, SOLUTION INTRAMUSCULAR; SUBCUTANEOUS
Status: SHIPPED | OUTPATIENT
Start: 2022-12-15

## 2022-12-15 RX ORDER — LEVOTHYROXINE SODIUM 25 MCG
25 TABLET ORAL DAILY
Qty: 90 TABLET | Refills: 3 | Status: SHIPPED | OUTPATIENT
Start: 2022-12-15

## 2022-12-15 RX ORDER — ERGOCALCIFEROL 1.25 MG/1
1 CAPSULE ORAL WEEKLY
Qty: 15 CAPSULE | Refills: 3 | Status: SHIPPED | OUTPATIENT
Start: 2022-12-15

## 2022-12-15 RX ORDER — EPINEPHRINE 0.3 MG/.3ML
INJECTION SUBCUTANEOUS
COMMUNITY
Start: 2022-09-12

## 2022-12-15 NOTE — PROGRESS NOTES
Chief complaint  Hypothyroidism and Prediabetes (Follow UP)    Subjective   Adeola Izaguirre is a 57 y.o. female is here today for follow-up.  Hypothyroidism: The patient is being seen for follow-up of hypothyroidism. The patient has Hashimoto's thyroiditis since age 17. Recent results: reviewed. Current treatment includes NP thyroid 60 mg and Synthroid 50 mcg .       Prediabetes she is managing prediabetes with diet, avoids pasta, bread.     Vitamin D deficiency 50 000 IU every other week and 5000 IU twice a week. Levels were very low.      C/o back pain and fatigue. She also c/o heat intolerance.   Had covid in the end of Nov - high fevers and minimal sx of cough and congestion.     Medications    Current Outpatient Medications:   •  Cholecalciferol (VITAMIN D3) 5000 UNITS tablet, Take  by mouth., Disp: , Rfl:   •  EPINEPHrine (EPIPEN) 0.3 MG/0.3ML solution auto-injector injection, epinephrine 0.3 mg/0.3 mL injection, auto-injector  INJECT INTO LATERAL THIGH FOR SEVERE ALLERGIC REACTION, Disp: , Rfl:   •  ergocalciferol (ERGOCALCIFEROL) 81126 units capsule, Take 1 capsule by mouth 1 (One) Time Per Week., Disp: 4 capsule, Rfl: 5  •  gabapentin (NEURONTIN) 100 MG capsule, gabapentin 100 mg capsule  TAKE 1-2 CAPSULES BY MOUTH THREE TIMES DAILY, Disp: , Rfl:   •  gabapentin (NEURONTIN) 300 MG capsule, gabapentin 300 mg capsule, Disp: , Rfl:   •  latanoprost (XALATAN) 0.005 % ophthalmic solution, latanoprost 0.005 % eye drops, Disp: , Rfl:   •  sodium-potassium-magnesium sulfates (Suprep Bowel Prep Kit) 17.5-3.13-1.6 GM/177ML solution oral solution, Please follow the directions in the letter mailed to you by our office for colonoscopy prep. If you have any questions call our office at [phone]., Disp: 354 mL, Rfl: 0  •  Synthroid 50 MCG tablet, Take 1 tablet by mouth Daily., Disp: 90 tablet, Rfl: 3  •  Thyroid (NP THYROID) 60 MG PO tablet, Take 1 tablet by mouth Daily., Disp: 90 tablet, Rfl: 3    Current  "Facility-Administered Medications:   •  cyanocobalamin injection 1,000 mcg, 1,000 mcg, Intramuscular, Q28 Days, Betty Hernandez MD, 1,000 mcg at 11/03/21 1638  •  cyanocobalamin injection 1,000 mcg, 1,000 mcg, Intramuscular, Q28 Days, Betty Hernandez MD, 1,000 mcg at 10/19/22 1110  •  cyanocobalamin injection 1,000 mcg, 1,000 mcg, Intramuscular, Q28 Days, Betty Hernandez MD  •  cyanocobalamin injection 1,000 mcg, 1,000 mcg, Intramuscular, Q28 Days, Betty Hernandez MD      Review of systems  Review of Systems   Constitutional: Positive for fatigue and unexpected weight change (weight gain). Negative for activity change, appetite change, chills and diaphoresis.   HENT: Negative for congestion, ear pain, facial swelling, hearing loss, postnasal drip, sore throat, trouble swallowing and voice change.    Eyes: Negative for redness and visual disturbance.   Respiratory: Negative for cough and shortness of breath.    Cardiovascular: Positive for leg swelling. Negative for chest pain and palpitations.   Gastrointestinal: Positive for constipation. Negative for abdominal distention, abdominal pain, diarrhea, nausea and vomiting.   Endocrine: Positive for heat intolerance.   Genitourinary: Negative.    Musculoskeletal: Positive for arthralgias, joint swelling and myalgias. Negative for back pain, neck pain and neck stiffness.   Skin: Negative.    Allergic/Immunologic: Negative.    Neurological: Negative for dizziness, tremors, syncope, weakness, light-headedness and headaches.   Hematological: Negative.    Psychiatric/Behavioral: Negative.    All other systems reviewed and are negative.      Physical exam  Objective   Blood pressure 118/74, pulse 83, height 165.1 cm (65\"), weight 100 kg (220 lb 6.4 oz), SpO2 98 %. Body mass index is 36.68 kg/m².  Physical Exam   Constitutional: She is oriented to person, place, and time. She appears well-developed.   HENT:   Head: Normocephalic and atraumatic.   Eyes: Conjunctivae are normal. "   Neck: Carotid bruit is not present. No thyroid mass and no thyromegaly present.   Cardiovascular: Normal rate, regular rhythm and normal heart sounds.   Pulmonary/Chest: Effort normal and breath sounds normal.   Neurological: She is alert and oriented to person, place, and time.   Skin: Skin is warm. No rash noted.   Psychiatric: Thought content normal.   Vitals reviewed.        LABS AND IMAGING  Office Visit on 12/15/2022   Component Date Value Ref Range Status   • Hemoglobin A1C 12/15/2022 6.3  % Final   • Lot Number 12/15/2022 [id number]   Final   • Expiration Date 12/15/2022 10/17/2024   Final   • Glucose 12/15/2022 127  70 - 130 mg/dL Final   • Lot Number 12/15/2022 [id number]   Final   • Expiration Date 12/15/2022 06/23/2023   Final   Lab on 12/13/2022   Component Date Value Ref Range Status   • Hemoglobin A1C 12/13/2022 6.40 (H)  4.80 - 5.60 % Final   • Glucose 12/13/2022 102 (H)  65 - 99 mg/dL Final   • BUN 12/13/2022 12  6 - 20 mg/dL Final   • Creatinine 12/13/2022 0.72  0.57 - 1.00 mg/dL Final   • Sodium 12/13/2022 143  136 - 145 mmol/L Final   • Potassium 12/13/2022 4.7  3.5 - 5.2 mmol/L Final   • Chloride 12/13/2022 107  98 - 107 mmol/L Final   • CO2 12/13/2022 25.7  22.0 - 29.0 mmol/L Final   • Calcium 12/13/2022 9.9  8.6 - 10.5 mg/dL Final   • Total Protein 12/13/2022 7.1  6.0 - 8.5 g/dL Final   • Albumin 12/13/2022 4.40  3.50 - 5.20 g/dL Final   • ALT (SGPT) 12/13/2022 27  1 - 33 U/L Final   • AST (SGOT) 12/13/2022 22  1 - 32 U/L Final   • Alkaline Phosphatase 12/13/2022 64  39 - 117 U/L Final   • Total Bilirubin 12/13/2022 0.3  0.0 - 1.2 mg/dL Final   • Globulin 12/13/2022 2.7  gm/dL Final   • A/G Ratio 12/13/2022 1.6  g/dL Final   • BUN/Creatinine Ratio 12/13/2022 16.7  7.0 - 25.0 Final   • Anion Gap 12/13/2022 10.3  5.0 - 15.0 mmol/L Final   • eGFR 12/13/2022 97.1  >60.0 mL/min/1.73 Final    National Kidney Foundation and American Society of Nephrology (ASN) Task Force recommended calculation  based on the Chronic Kidney Disease Epidemiology Collaboration (CKD-EPI) equation refit without adjustment for race.   • Free T4 12/13/2022 1.10  0.93 - 1.70 ng/dL Final   • TSH 12/13/2022 0.196 (L)  0.270 - 4.200 uIU/mL Final   • Vitamin B-12 12/13/2022 490  211 - 946 pg/mL Final   • 25 Hydroxy, Vitamin D 12/13/2022 28.5 (L)  30.0 - 100.0 ng/ml Final           Assessment  Assessment & Plan   Problems Addressed this Visit        Other    Vitamin D deficiency    Prediabetes - Primary    Hypothyroidism    B12 deficiency   Diagnoses       Codes Comments    Prediabetes    -  Primary ICD-10-CM: R73.03  ICD-9-CM: 790.29     Hypothyroidism due to Hashimoto's thyroiditis     ICD-10-CM: E03.8, E06.3  ICD-9-CM: 244.8, 245.2     B12 deficiency     ICD-10-CM: E53.8  ICD-9-CM: 266.2     Vitamin D deficiency     ICD-10-CM: E55.9  ICD-9-CM: 268.9           Plan  - Synthroid decreased to 25 mcg with NP thyroid 60 mg  Labs results reviewed.     -B12 deficiency -  continue monthly injections for now.     -Vit D 5000 daily OTC supplements daily and q14 days 50 000 IU. Repeat levels showed borderline low levels, increase to 60 000 IU weekly .     - prediabetes - A1C is increased to 6.4. intensify the diet. She has reported eating more carbs. I have encouraged low carb diet and increased exercise. Activity is limited due to knee pain.   I have provided educational materials.    Follow-up in 4 months

## 2022-12-15 NOTE — PATIENT INSTRUCTIONS
Results for orders placed or performed in visit on 12/15/22   POC Glycosylated Hemoglobin (Hb A1C)    Specimen: Blood   Result Value Ref Range    Hemoglobin A1C 6.3 %    Lot Number [id number]     Expiration Date 10/17/2024    POC Glucose, Blood    Specimen: Blood   Result Value Ref Range    Glucose 127 70 - 130 mg/dL    Lot Number [id number]     Expiration Date 06/23/2023       Office Visit on 12/15/2022   Component Date Value Ref Range Status    Hemoglobin A1C 12/15/2022 6.3  % Final    Lot Number 12/15/2022 [id number]   Final    Expiration Date 12/15/2022 10/17/2024   Final    Glucose 12/15/2022 127  70 - 130 mg/dL Final    Lot Number 12/15/2022 [id number]   Final    Expiration Date 12/15/2022 06/23/2023   Final   Lab on 12/13/2022   Component Date Value Ref Range Status    Hemoglobin A1C 12/13/2022 6.40 (H)  4.80 - 5.60 % Final    Glucose 12/13/2022 102 (H)  65 - 99 mg/dL Final    BUN 12/13/2022 12  6 - 20 mg/dL Final    Creatinine 12/13/2022 0.72  0.57 - 1.00 mg/dL Final    Sodium 12/13/2022 143  136 - 145 mmol/L Final    Potassium 12/13/2022 4.7  3.5 - 5.2 mmol/L Final    Chloride 12/13/2022 107  98 - 107 mmol/L Final    CO2 12/13/2022 25.7  22.0 - 29.0 mmol/L Final    Calcium 12/13/2022 9.9  8.6 - 10.5 mg/dL Final    Total Protein 12/13/2022 7.1  6.0 - 8.5 g/dL Final    Albumin 12/13/2022 4.40  3.50 - 5.20 g/dL Final    ALT (SGPT) 12/13/2022 27  1 - 33 U/L Final    AST (SGOT) 12/13/2022 22  1 - 32 U/L Final    Alkaline Phosphatase 12/13/2022 64  39 - 117 U/L Final    Total Bilirubin 12/13/2022 0.3  0.0 - 1.2 mg/dL Final    Globulin 12/13/2022 2.7  gm/dL Final    A/G Ratio 12/13/2022 1.6  g/dL Final    BUN/Creatinine Ratio 12/13/2022 16.7  7.0 - 25.0 Final    Anion Gap 12/13/2022 10.3  5.0 - 15.0 mmol/L Final    eGFR 12/13/2022 97.1  >60.0 mL/min/1.73 Final    National Kidney Foundation and American Society of Nephrology (ASN) Task Force recommended calculation based on the Chronic Kidney Disease Epidemiology  Collaboration (CKD-EPI) equation refit without adjustment for race.    Free T4 12/13/2022 1.10  0.93 - 1.70 ng/dL Final    TSH 12/13/2022 0.196 (L)  0.270 - 4.200 uIU/mL Final    Vitamin B-12 12/13/2022 490  211 - 946 pg/mL Final    25 Hydroxy, Vitamin D 12/13/2022 28.5 (L)  30.0 - 100.0 ng/ml Final

## 2022-12-20 RX ORDER — SODIUM, POTASSIUM,MAG SULFATES 17.5-3.13G
SOLUTION, RECONSTITUTED, ORAL ORAL
Qty: 354 ML | Refills: 0 | Status: SHIPPED | OUTPATIENT
Start: 2022-12-20

## 2023-02-01 ENCOUNTER — OUTSIDE FACILITY SERVICE (OUTPATIENT)
Dept: GASTROENTEROLOGY | Facility: CLINIC | Age: 58
End: 2023-02-01
Payer: COMMERCIAL

## 2023-02-01 PROCEDURE — 45378 DIAGNOSTIC COLONOSCOPY: CPT | Performed by: INTERNAL MEDICINE

## 2023-02-22 RX ORDER — LEVOTHYROXINE SODIUM 50 MCG
TABLET ORAL
Qty: 90 TABLET | Refills: 3 | OUTPATIENT
Start: 2023-02-22

## 2023-03-27 RX ORDER — LEVOTHYROXINE, LIOTHYRONINE 38; 9 UG/1; UG/1
TABLET ORAL
Qty: 90 TABLET | Refills: 0 | Status: SHIPPED | OUTPATIENT
Start: 2023-03-27

## 2023-05-24 ENCOUNTER — TRANSCRIBE ORDERS (OUTPATIENT)
Dept: LAB | Facility: HOSPITAL | Age: 58
End: 2023-05-24
Payer: COMMERCIAL

## 2023-05-24 ENCOUNTER — LAB (OUTPATIENT)
Dept: LAB | Facility: HOSPITAL | Age: 58
End: 2023-05-24
Payer: COMMERCIAL

## 2023-05-24 DIAGNOSIS — N95.1 SYMPTOMATIC MENOPAUSAL OR FEMALE CLIMACTERIC STATES: Primary | ICD-10-CM

## 2023-05-24 DIAGNOSIS — E06.3 CHRONIC LYMPHOCYTIC THYROIDITIS: ICD-10-CM

## 2023-05-26 ENCOUNTER — OFFICE VISIT (OUTPATIENT)
Dept: ENDOCRINOLOGY | Facility: CLINIC | Age: 58
End: 2023-05-26
Payer: COMMERCIAL

## 2023-05-26 ENCOUNTER — LAB (OUTPATIENT)
Dept: LAB | Facility: HOSPITAL | Age: 58
End: 2023-05-26
Payer: COMMERCIAL

## 2023-05-26 VITALS
HEART RATE: 92 BPM | SYSTOLIC BLOOD PRESSURE: 146 MMHG | BODY MASS INDEX: 35.99 KG/M2 | HEIGHT: 65 IN | DIASTOLIC BLOOD PRESSURE: 90 MMHG | OXYGEN SATURATION: 99 % | WEIGHT: 216 LBS

## 2023-05-26 DIAGNOSIS — E55.9 VITAMIN D DEFICIENCY: ICD-10-CM

## 2023-05-26 DIAGNOSIS — E06.3 HYPOTHYROIDISM DUE TO HASHIMOTO'S THYROIDITIS: Primary | ICD-10-CM

## 2023-05-26 DIAGNOSIS — E06.3 CHRONIC LYMPHOCYTIC THYROIDITIS: ICD-10-CM

## 2023-05-26 DIAGNOSIS — E06.3 HYPOTHYROIDISM DUE TO HASHIMOTO'S THYROIDITIS: ICD-10-CM

## 2023-05-26 DIAGNOSIS — R73.03 PREDIABETES: ICD-10-CM

## 2023-05-26 DIAGNOSIS — E03.8 HYPOTHYROIDISM DUE TO HASHIMOTO'S THYROIDITIS: Primary | ICD-10-CM

## 2023-05-26 DIAGNOSIS — E53.8 B12 DEFICIENCY: ICD-10-CM

## 2023-05-26 DIAGNOSIS — E03.8 HYPOTHYROIDISM DUE TO HASHIMOTO'S THYROIDITIS: ICD-10-CM

## 2023-05-26 DIAGNOSIS — N95.1 SYMPTOMATIC MENOPAUSAL OR FEMALE CLIMACTERIC STATES: ICD-10-CM

## 2023-05-26 LAB
25(OH)D3 SERPL-MCNC: 33.1 NG/ML (ref 30–100)
ALBUMIN SERPL-MCNC: 4.6 G/DL (ref 3.5–5.2)
ALBUMIN/GLOB SERPL: 1.6 G/DL
ALP SERPL-CCNC: 63 U/L (ref 39–117)
ALT SERPL W P-5'-P-CCNC: 28 U/L (ref 1–33)
ANION GAP SERPL CALCULATED.3IONS-SCNC: 12.4 MMOL/L (ref 5–15)
AST SERPL-CCNC: 24 U/L (ref 1–32)
BILIRUB SERPL-MCNC: 0.4 MG/DL (ref 0–1.2)
BUN SERPL-MCNC: 13 MG/DL (ref 6–20)
BUN/CREAT SERPL: 19.4 (ref 7–25)
CALCIUM SPEC-SCNC: 9.9 MG/DL (ref 8.6–10.5)
CHLORIDE SERPL-SCNC: 106 MMOL/L (ref 98–107)
CHOLEST SERPL-MCNC: 186 MG/DL (ref 0–200)
CO2 SERPL-SCNC: 23.6 MMOL/L (ref 22–29)
CREAT SERPL-MCNC: 0.67 MG/DL (ref 0.57–1)
EGFRCR SERPLBLD CKD-EPI 2021: 100.8 ML/MIN/1.73
ESTRADIOL SERPL HS-MCNC: 13.1 PG/ML
GLOBULIN UR ELPH-MCNC: 2.9 GM/DL
GLUCOSE SERPL-MCNC: 108 MG/DL (ref 65–99)
HBA1C MFR BLD: 6.2 % (ref 4.8–5.6)
HDLC SERPL-MCNC: 33 MG/DL (ref 40–60)
LDLC SERPL CALC-MCNC: 109 MG/DL (ref 0–100)
LDLC/HDLC SERPL: 3.12 {RATIO}
POTASSIUM SERPL-SCNC: 4.1 MMOL/L (ref 3.5–5.2)
PROGEST SERPL-MCNC: 0.06 NG/ML
PROT SERPL-MCNC: 7.5 G/DL (ref 6–8.5)
SODIUM SERPL-SCNC: 142 MMOL/L (ref 136–145)
T3 SERPL-MCNC: 101 NG/DL (ref 80–200)
T4 FREE SERPL-MCNC: 0.82 NG/DL (ref 0.93–1.7)
TESTOST SERPL-MCNC: 12.4 NG/DL (ref 2.9–40.8)
TRIGL SERPL-MCNC: 251 MG/DL (ref 0–150)
TSH SERPL DL<=0.05 MIU/L-ACNC: 1.86 UIU/ML (ref 0.27–4.2)
VLDLC SERPL-MCNC: 44 MG/DL (ref 5–40)

## 2023-05-26 PROCEDURE — 36415 COLL VENOUS BLD VENIPUNCTURE: CPT

## 2023-05-26 PROCEDURE — 84480 ASSAY TRIIODOTHYRONINE (T3): CPT

## 2023-05-26 PROCEDURE — 82670 ASSAY OF TOTAL ESTRADIOL: CPT

## 2023-05-26 PROCEDURE — 83036 HEMOGLOBIN GLYCOSYLATED A1C: CPT

## 2023-05-26 PROCEDURE — 84403 ASSAY OF TOTAL TESTOSTERONE: CPT

## 2023-05-26 PROCEDURE — 80061 LIPID PANEL: CPT

## 2023-05-26 PROCEDURE — 84443 ASSAY THYROID STIM HORMONE: CPT

## 2023-05-26 PROCEDURE — 84439 ASSAY OF FREE THYROXINE: CPT

## 2023-05-26 PROCEDURE — 80053 COMPREHEN METABOLIC PANEL: CPT

## 2023-05-26 PROCEDURE — 82306 VITAMIN D 25 HYDROXY: CPT

## 2023-05-26 PROCEDURE — 86800 THYROGLOBULIN ANTIBODY: CPT

## 2023-05-26 PROCEDURE — 82627 DEHYDROEPIANDROSTERONE: CPT

## 2023-05-26 PROCEDURE — 84144 ASSAY OF PROGESTERONE: CPT

## 2023-05-26 PROCEDURE — 86376 MICROSOMAL ANTIBODY EACH: CPT

## 2023-05-26 RX ORDER — LEVOTHYROXINE AND LIOTHYRONINE 38; 9 UG/1; UG/1
60 TABLET ORAL DAILY
Qty: 90 TABLET | Refills: 3 | Status: SHIPPED | OUTPATIENT
Start: 2023-05-26

## 2023-05-26 RX ADMIN — CYANOCOBALAMIN 1000 MCG: 1000 INJECTION, SOLUTION INTRAMUSCULAR; SUBCUTANEOUS at 11:59

## 2023-05-26 NOTE — PROGRESS NOTES
Chief complaint  Prediabetes and Hypothyroidism    Subjective   Adeola Izagiurre is a 58 y.o. female is here today for follow-up.  Hypothyroidism: The patient is being seen for follow-up of hypothyroidism. The patient has Hashimoto's thyroiditis since age 17. Recent results: reviewed. Current treatment includes NP thyroid 60 mg and Synthroid 50 mcg .       Prediabetes she is managing prediabetes with diet, avoids pasta, bread.   Last time A1C was higher and she intensified the diet.     Vitamin D deficiency 50 000 IU every other week and 5000 IU twice a week. Levels were very low.      C/o She is feeling better - thinks that NP thyroid is working better . She had labs done this morning.     Medications    Current Outpatient Medications:   •  Cholecalciferol (VITAMIN D3) 5000 UNITS tablet, Take  by mouth., Disp: , Rfl:   •  EPINEPHrine (EPIPEN) 0.3 MG/0.3ML solution auto-injector injection, epinephrine 0.3 mg/0.3 mL injection, auto-injector  INJECT INTO LATERAL THIGH FOR SEVERE ALLERGIC REACTION, Disp: , Rfl:   •  ergocalciferol (ERGOCALCIFEROL) 1.25 MG (14248 UT) capsule, Take 1 capsule by mouth 1 (One) Time Per Week., Disp: 15 capsule, Rfl: 3  •  gabapentin (NEURONTIN) 100 MG capsule, gabapentin 100 mg capsule  TAKE 1-2 CAPSULES BY MOUTH THREE TIMES DAILY, Disp: , Rfl:   •  gabapentin (NEURONTIN) 300 MG capsule, gabapentin 300 mg capsule, Disp: , Rfl:   •  latanoprost (XALATAN) 0.005 % ophthalmic solution, latanoprost 0.005 % eye drops, Disp: , Rfl:   •  sodium-potassium-magnesium sulfates (Suprep Bowel Prep Kit) 17.5-3.13-1.6 GM/177ML solution oral solution, Please follow the directions in the letter mailed to you by our office for colonoscopy prep. If you have any questions call our office at [phone]., Disp: 354 mL, Rfl: 0  •  Synthroid 25 MCG tablet, Take 1 tablet by mouth Daily., Disp: 90 tablet, Rfl: 3  •  Thyroid (NP THYROID) 60 MG PO tablet, Take 1 tablet by mouth Daily., Disp: 90 tablet, Rfl:  "3    Current Facility-Administered Medications:   •  cyanocobalamin injection 1,000 mcg, 1,000 mcg, Intramuscular, Q28 Days, Betty Hernandez MD, 1,000 mcg at 11/03/21 1638  •  cyanocobalamin injection 1,000 mcg, 1,000 mcg, Intramuscular, Q28 Days, Betty Hernandez MD, 1,000 mcg at 10/19/22 1110  •  cyanocobalamin injection 1,000 mcg, 1,000 mcg, Intramuscular, Q28 Days, Betty Hernandez MD  •  cyanocobalamin injection 1,000 mcg, 1,000 mcg, Intramuscular, Q28 Days, Betty Hernandez MD  •  cyanocobalamin injection 1,000 mcg, 1,000 mcg, Intramuscular, Q28 Days, Betty Hernandez MD      Review of systems  Review of Systems   Constitutional: Positive for fatigue and unexpected weight change (weight gain).   HENT: Positive for hearing loss.    Cardiovascular: Positive for leg swelling.   Gastrointestinal: Positive for constipation.   Endocrine: Positive for heat intolerance.   Musculoskeletal: Positive for arthralgias, joint swelling and myalgias.       Physical exam  Objective   Blood pressure 146/90, pulse 92, height 165.1 cm (65\"), weight 98 kg (216 lb), SpO2 99 %. Body mass index is 35.94 kg/m².  Physical Exam   Constitutional: She is oriented to person, place, and time. She appears well-developed.   HENT:   Head: Normocephalic and atraumatic.   Eyes: Conjunctivae are normal.   Neck: No thyroid mass and no thyromegaly present.   Cardiovascular: Normal rate, regular rhythm and normal heart sounds.   Pulmonary/Chest: Effort normal and breath sounds normal.   Neurological: She is alert and oriented to person, place, and time.   Psychiatric: Thought content normal.   Vitals reviewed.        LABS AND IMAGING  No visits with results within 1 Month(s) from this visit.   Latest known visit with results is:   Office Visit on 12/15/2022   Component Date Value Ref Range Status   • Hemoglobin A1C 12/15/2022 6.3  % Final   • Lot Number 12/15/2022 [id number]   Final   • Expiration Date 12/15/2022 10/17/2024   Final   • Glucose " 12/15/2022 127  70 - 130 mg/dL Final   • Lot Number 12/15/2022 [id number]   Final   • Expiration Date 12/15/2022 06/23/2023   Final           Assessment  Assessment & Plan   Problems Addressed this Visit        Other    Vitamin D deficiency    Prediabetes    Hypothyroidism - Primary    Relevant Medications    Thyroid (NP THYROID) 60 MG PO tablet   Diagnoses       Codes Comments    Hypothyroidism due to Hashimoto's thyroiditis    -  Primary ICD-10-CM: E03.8, E06.3  ICD-9-CM: 244.8, 245.2     Prediabetes     ICD-10-CM: R73.03  ICD-9-CM: 790.29     Vitamin D deficiency     ICD-10-CM: E55.9  ICD-9-CM: 268.9           Plan  - Synthroid decreased to 25 mcg with NP thyroid 60 mg  Lab results reviewed.     -B12 deficiency -  continue monthly injections for now.     -Vit D 5000 daily OTC supplements daily and q7 days 50 000 IU. - prediabetes - I have encouraged low carb diet and increased exercise. Activity is limited due to knee pain.     She had fasting labs done this morning and we will follow on the results    Follow-up in 4 months

## 2023-05-27 LAB — DHEA-S SERPL-MCNC: 68.9 UG/DL (ref 29.4–220.5)

## 2023-05-30 LAB
THYROGLOB AB SERPL-ACNC: 1 IU/ML (ref 0–0.9)
THYROPEROXIDASE AB SERPL-ACNC: 272 IU/ML (ref 0–34)

## 2023-07-27 ENCOUNTER — LAB (OUTPATIENT)
Dept: LAB | Facility: HOSPITAL | Age: 58
End: 2023-07-27
Payer: COMMERCIAL

## 2023-07-27 ENCOUNTER — TRANSCRIBE ORDERS (OUTPATIENT)
Dept: LAB | Facility: HOSPITAL | Age: 58
End: 2023-07-27
Payer: COMMERCIAL

## 2023-07-27 DIAGNOSIS — E06.3 CHRONIC LYMPHOCYTIC THYROIDITIS: ICD-10-CM

## 2023-07-27 DIAGNOSIS — N95.1 FEMALE CLIMACTERIC STATE: ICD-10-CM

## 2023-07-27 DIAGNOSIS — R73.03 DIABETES MELLITUS, LATENT: ICD-10-CM

## 2023-07-27 DIAGNOSIS — E06.3 CHRONIC LYMPHOCYTIC THYROIDITIS: Primary | ICD-10-CM

## 2023-07-27 PROCEDURE — 82495 ASSAY OF CHROMIUM: CPT

## 2023-07-27 PROCEDURE — 86364 TISS TRNSGLTMNASE EA IG CLAS: CPT

## 2023-07-27 PROCEDURE — 36415 COLL VENOUS BLD VENIPUNCTURE: CPT

## 2023-07-27 PROCEDURE — 82784 ASSAY IGA/IGD/IGG/IGM EACH: CPT

## 2023-07-27 PROCEDURE — 86258 DGP ANTIBODY EACH IG CLASS: CPT

## 2023-07-27 PROCEDURE — 86231 EMA EACH IG CLASS: CPT

## 2023-07-27 PROCEDURE — 84140 ASSAY OF PREGNENOLONE: CPT

## 2023-07-31 LAB
ENDOMYSIUM IGA SER QL: NEGATIVE
GLIADIN PEPTIDE IGA SER-ACNC: 7 UNITS (ref 0–19)
GLIADIN PEPTIDE IGG SER-ACNC: 2 UNITS (ref 0–19)
IGA SERPL-MCNC: 196 MG/DL (ref 87–352)
TTG IGA SER-ACNC: <2 U/ML (ref 0–3)
TTG IGG SER-ACNC: 5 U/ML (ref 0–5)

## 2023-08-02 LAB
CR SERPL-MCNC: 0.6 UG/L (ref 0.1–2.1)
PREG SERPL-MCNC: 14 NG/DL

## 2023-09-25 ENCOUNTER — LAB (OUTPATIENT)
Dept: LAB | Facility: HOSPITAL | Age: 58
End: 2023-09-25
Payer: COMMERCIAL

## 2023-09-25 ENCOUNTER — TRANSCRIBE ORDERS (OUTPATIENT)
Dept: LAB | Facility: HOSPITAL | Age: 58
End: 2023-09-25
Payer: COMMERCIAL

## 2023-09-25 DIAGNOSIS — N95.1 SYMPTOMATIC MENOPAUSAL OR FEMALE CLIMACTERIC STATES: ICD-10-CM

## 2023-09-25 DIAGNOSIS — E06.3 CHRONIC LYMPHOCYTIC THYROIDITIS: ICD-10-CM

## 2023-09-25 DIAGNOSIS — E05.00 TOXIC DIFFUSE GOITER WITH PRETIBIAL MYXEDEMA: ICD-10-CM

## 2023-09-25 DIAGNOSIS — E03.8 TOXIC DIFFUSE GOITER WITH PRETIBIAL MYXEDEMA: Primary | ICD-10-CM

## 2023-09-25 DIAGNOSIS — E05.00 TOXIC DIFFUSE GOITER WITH PRETIBIAL MYXEDEMA: Primary | ICD-10-CM

## 2023-09-25 DIAGNOSIS — E03.8 TOXIC DIFFUSE GOITER WITH PRETIBIAL MYXEDEMA: ICD-10-CM

## 2023-09-25 LAB
T3FREE SERPL-MCNC: 5.47 PG/ML (ref 2–4.4)
T4 FREE SERPL-MCNC: 0.9 NG/DL (ref 0.93–1.7)
TSH SERPL DL<=0.05 MIU/L-ACNC: 1.62 UIU/ML (ref 0.27–4.2)

## 2023-09-25 PROCEDURE — 84481 FREE ASSAY (FT-3): CPT

## 2023-09-25 PROCEDURE — 82627 DEHYDROEPIANDROSTERONE: CPT

## 2023-09-25 PROCEDURE — 84443 ASSAY THYROID STIM HORMONE: CPT

## 2023-09-25 PROCEDURE — 84439 ASSAY OF FREE THYROXINE: CPT

## 2023-09-25 PROCEDURE — 36415 COLL VENOUS BLD VENIPUNCTURE: CPT

## 2023-09-26 LAB — DHEA-S SERPL-MCNC: 370 UG/DL (ref 29.4–220.5)

## 2024-06-07 ENCOUNTER — TRANSCRIBE ORDERS (OUTPATIENT)
Dept: LAB | Facility: HOSPITAL | Age: 59
End: 2024-06-07
Payer: COMMERCIAL

## 2024-06-07 ENCOUNTER — LAB (OUTPATIENT)
Dept: LAB | Facility: HOSPITAL | Age: 59
End: 2024-06-07
Payer: COMMERCIAL

## 2024-06-07 DIAGNOSIS — E53.8 BIOTIN-(PROPIONYL-COA-CARBOXYLASE) LIGASE DEFICIENCY: ICD-10-CM

## 2024-06-07 DIAGNOSIS — E06.3 CHRONIC LYMPHOCYTIC THYROIDITIS: Primary | ICD-10-CM

## 2024-06-07 DIAGNOSIS — E55.9 AVITAMINOSIS D: ICD-10-CM

## 2024-06-07 DIAGNOSIS — R73.03 DIABETES MELLITUS, LATENT: ICD-10-CM

## 2024-06-07 DIAGNOSIS — N95.1 SYMPTOMATIC MENOPAUSAL OR FEMALE CLIMACTERIC STATES: ICD-10-CM

## 2024-06-07 DIAGNOSIS — E06.3 CHRONIC LYMPHOCYTIC THYROIDITIS: ICD-10-CM

## 2024-06-07 LAB
HBA1C MFR BLD: 5.7 % (ref 4.8–5.6)
T4 FREE SERPL-MCNC: 1.22 NG/DL (ref 0.92–1.68)
TSH SERPL DL<=0.05 MIU/L-ACNC: 2.21 UIU/ML (ref 0.27–4.2)

## 2024-06-07 PROCEDURE — 84443 ASSAY THYROID STIM HORMONE: CPT

## 2024-06-07 PROCEDURE — 84402 ASSAY OF FREE TESTOSTERONE: CPT

## 2024-06-07 PROCEDURE — 82607 VITAMIN B-12: CPT

## 2024-06-07 PROCEDURE — 83036 HEMOGLOBIN GLYCOSYLATED A1C: CPT

## 2024-06-07 PROCEDURE — 82627 DEHYDROEPIANDROSTERONE: CPT

## 2024-06-07 PROCEDURE — 83789 MASS SPECTROMETRY QUAL/QUAN: CPT

## 2024-06-07 PROCEDURE — 83525 ASSAY OF INSULIN: CPT

## 2024-06-07 PROCEDURE — 36415 COLL VENOUS BLD VENIPUNCTURE: CPT

## 2024-06-07 PROCEDURE — 82306 VITAMIN D 25 HYDROXY: CPT

## 2024-06-07 PROCEDURE — 86376 MICROSOMAL ANTIBODY EACH: CPT

## 2024-06-07 PROCEDURE — 84439 ASSAY OF FREE THYROXINE: CPT

## 2024-06-07 PROCEDURE — 86800 THYROGLOBULIN ANTIBODY: CPT

## 2024-06-07 PROCEDURE — 84481 FREE ASSAY (FT-3): CPT

## 2024-06-07 PROCEDURE — 84403 ASSAY OF TOTAL TESTOSTERONE: CPT

## 2024-06-08 LAB
25(OH)D3 SERPL-MCNC: 46.4 NG/ML (ref 30–100)
T3FREE SERPL-MCNC: 3.17 PG/ML (ref 2–4.4)
VIT B12 BLD-MCNC: >2000 PG/ML (ref 211–946)

## 2024-06-09 LAB
DHEA-S SERPL-MCNC: 74.4 UG/DL (ref 29.4–220.5)
THYROPEROXIDASE AB SERPL-ACNC: 225 IU/ML (ref 0–34)

## 2024-06-10 LAB
IODINE SERPL-MCNC: 49.8 UG/L (ref 40–92)
THYROGLOB AB SERPL-ACNC: 1.1 IU/ML (ref 0–0.9)

## 2024-06-13 LAB
TESTOST FREE MFR SERPL: 2.49 % (ref 0.5–2.8)
TESTOST FREE SERPL-MCNC: 0.52 NG/DL (ref 0.1–0.85)
TESTOST SERPL-MCNC: 21 NG/DL (ref 7–40)

## 2024-06-14 LAB — INSULIN SERPL-ACNC: 6.9 UIU/ML

## 2024-06-18 ENCOUNTER — LAB (OUTPATIENT)
Dept: LAB | Facility: HOSPITAL | Age: 59
End: 2024-06-18
Payer: COMMERCIAL

## 2024-06-18 ENCOUNTER — TRANSCRIBE ORDERS (OUTPATIENT)
Dept: LAB | Facility: HOSPITAL | Age: 59
End: 2024-06-18
Payer: COMMERCIAL

## 2024-06-18 DIAGNOSIS — N95.1 SYMPTOMATIC MENOPAUSAL OR FEMALE CLIMACTERIC STATES: Primary | ICD-10-CM

## 2024-06-18 DIAGNOSIS — N95.1 SYMPTOMATIC MENOPAUSAL OR FEMALE CLIMACTERIC STATES: ICD-10-CM

## 2024-06-18 LAB
ESTRADIOL SERPL HS-MCNC: 20.6 PG/ML
PROGEST SERPL-MCNC: 0.27 NG/ML

## 2024-06-18 PROCEDURE — 36415 COLL VENOUS BLD VENIPUNCTURE: CPT

## 2024-06-18 PROCEDURE — 84140 ASSAY OF PREGNENOLONE: CPT

## 2024-06-18 PROCEDURE — 84144 ASSAY OF PROGESTERONE: CPT

## 2024-06-18 PROCEDURE — 82670 ASSAY OF TOTAL ESTRADIOL: CPT

## 2024-06-28 LAB — PREG SERPL-MCNC: 10 NG/DL

## 2024-09-11 ENCOUNTER — LAB (OUTPATIENT)
Dept: LAB | Facility: HOSPITAL | Age: 59
End: 2024-09-11
Payer: COMMERCIAL

## 2024-09-11 ENCOUNTER — TRANSCRIBE ORDERS (OUTPATIENT)
Dept: LAB | Facility: HOSPITAL | Age: 59
End: 2024-09-11
Payer: COMMERCIAL

## 2024-09-11 DIAGNOSIS — E06.3 CHRONIC LYMPHOCYTIC THYROIDITIS: ICD-10-CM

## 2024-09-11 DIAGNOSIS — R73.03 DIABETES MELLITUS, LATENT: ICD-10-CM

## 2024-09-11 DIAGNOSIS — E61.8 IODINE DEFICIENCY DISEASE, NON GOITER: ICD-10-CM

## 2024-09-11 DIAGNOSIS — Z79.890 NEED FOR PROPHYLACTIC HORMONE REPLACEMENT THERAPY (POSTMENOPAUSAL): ICD-10-CM

## 2024-09-11 DIAGNOSIS — N95.1 SYMPTOMATIC MENOPAUSAL OR FEMALE CLIMACTERIC STATES: ICD-10-CM

## 2024-09-11 DIAGNOSIS — R73.03 DIABETES MELLITUS, LATENT: Primary | ICD-10-CM

## 2024-09-11 LAB
HBA1C MFR BLD: 5.5 % (ref 4.8–5.6)
T3FREE SERPL-MCNC: 2.54 PG/ML (ref 2–4.4)
T4 FREE SERPL-MCNC: 1.17 NG/DL (ref 0.92–1.68)
TSH SERPL DL<=0.05 MIU/L-ACNC: 1.49 UIU/ML (ref 0.27–4.2)

## 2024-09-11 PROCEDURE — 83525 ASSAY OF INSULIN: CPT

## 2024-09-11 PROCEDURE — 86800 THYROGLOBULIN ANTIBODY: CPT

## 2024-09-11 PROCEDURE — 36415 COLL VENOUS BLD VENIPUNCTURE: CPT

## 2024-09-11 PROCEDURE — 84439 ASSAY OF FREE THYROXINE: CPT

## 2024-09-11 PROCEDURE — 84403 ASSAY OF TOTAL TESTOSTERONE: CPT

## 2024-09-11 PROCEDURE — 86376 MICROSOMAL ANTIBODY EACH: CPT

## 2024-09-11 PROCEDURE — 84481 FREE ASSAY (FT-3): CPT

## 2024-09-11 PROCEDURE — 82627 DEHYDROEPIANDROSTERONE: CPT

## 2024-09-11 PROCEDURE — 83036 HEMOGLOBIN GLYCOSYLATED A1C: CPT

## 2024-09-11 PROCEDURE — 84402 ASSAY OF FREE TESTOSTERONE: CPT

## 2024-09-11 PROCEDURE — 84443 ASSAY THYROID STIM HORMONE: CPT

## 2024-09-12 LAB
DHEA-S SERPL-MCNC: 82.2 UG/DL (ref 29.4–220.5)
SPECIMEN STATUS: NORMAL
THYROGLOB AB SERPL-ACNC: 1.1 IU/ML (ref 0–0.9)
THYROPEROXIDASE AB SERPL-ACNC: 212 IU/ML (ref 0–34)

## 2024-09-13 LAB — INSULIN SERPL-ACNC: 11.9 UIU/ML (ref 2.6–24.9)

## 2024-09-18 LAB
TESTOST FREE MFR SERPL: 1.56 % (ref 0.5–2.8)
TESTOST FREE SERPL-MCNC: 0.26 NG/DL (ref 0.1–0.85)
TESTOST SERPL-MCNC: 16.6 NG/DL (ref 7–40)

## 2024-11-07 ENCOUNTER — LAB (OUTPATIENT)
Dept: LAB | Facility: HOSPITAL | Age: 59
End: 2024-11-07
Payer: COMMERCIAL

## 2024-11-07 DIAGNOSIS — E05.00 TOXIC DIFFUSE GOITER WITH PRETIBIAL MYXEDEMA: ICD-10-CM

## 2024-11-07 DIAGNOSIS — R73.03 DIABETES MELLITUS, LATENT: ICD-10-CM

## 2024-11-07 DIAGNOSIS — E03.8 TOXIC DIFFUSE GOITER WITH PRETIBIAL MYXEDEMA: ICD-10-CM

## 2024-11-07 DIAGNOSIS — E06.3 CHRONIC LYMPHOCYTIC THYROIDITIS: ICD-10-CM

## 2024-11-07 DIAGNOSIS — E61.8 IODINE DEFICIENCY DISEASE, NON GOITER: Primary | ICD-10-CM

## 2024-11-07 DIAGNOSIS — N95.1 SYMPTOMATIC MENOPAUSAL OR FEMALE CLIMACTERIC STATES: ICD-10-CM

## 2024-11-07 LAB
HBA1C MFR BLD: 5.3 % (ref 4.8–5.6)
T3FREE SERPL-MCNC: 2.11 PG/ML (ref 2–4.4)
T4 FREE SERPL-MCNC: 1.12 NG/DL (ref 0.92–1.68)
TSH SERPL DL<=0.05 MIU/L-ACNC: 3.94 UIU/ML (ref 0.27–4.2)

## 2024-11-07 PROCEDURE — 83036 HEMOGLOBIN GLYCOSYLATED A1C: CPT

## 2024-11-07 PROCEDURE — 82627 DEHYDROEPIANDROSTERONE: CPT

## 2024-11-07 PROCEDURE — 82679 ASSAY OF ESTRONE: CPT

## 2024-11-07 PROCEDURE — 86376 MICROSOMAL ANTIBODY EACH: CPT

## 2024-11-07 PROCEDURE — 36415 COLL VENOUS BLD VENIPUNCTURE: CPT

## 2024-11-07 PROCEDURE — 86800 THYROGLOBULIN ANTIBODY: CPT

## 2024-11-07 PROCEDURE — 83789 MASS SPECTROMETRY QUAL/QUAN: CPT

## 2024-11-07 PROCEDURE — 84481 FREE ASSAY (FT-3): CPT

## 2024-11-07 PROCEDURE — 84443 ASSAY THYROID STIM HORMONE: CPT

## 2024-11-07 PROCEDURE — 84439 ASSAY OF FREE THYROXINE: CPT

## 2024-11-07 PROCEDURE — 84140 ASSAY OF PREGNENOLONE: CPT

## 2024-11-08 LAB
DHEA-S SERPL-MCNC: 275 UG/DL (ref 29.4–220.5)
THYROGLOB AB SERPL-ACNC: 1.4 IU/ML (ref 0–0.9)
THYROPEROXIDASE AB SERPL-ACNC: 189 IU/ML (ref 0–34)

## 2024-11-11 LAB — IODINE SERPL-MCNC: 50.9 UG/L (ref 40–92)

## 2024-11-12 LAB — ESTRONE SERPL-MCNC: 61 PG/ML

## 2024-11-18 LAB — PREG SERPL-MCNC: 27 NG/DL

## 2025-03-17 ENCOUNTER — TRANSCRIBE ORDERS (OUTPATIENT)
Dept: LAB | Facility: HOSPITAL | Age: 60
End: 2025-03-17
Payer: COMMERCIAL

## 2025-03-17 ENCOUNTER — LAB (OUTPATIENT)
Dept: LAB | Facility: HOSPITAL | Age: 60
End: 2025-03-17
Payer: COMMERCIAL

## 2025-03-17 DIAGNOSIS — E61.8 IODINE DEFICIENCY DISEASE, NON GOITER: ICD-10-CM

## 2025-03-17 DIAGNOSIS — E06.3 CHRONIC LYMPHOCYTIC THYROIDITIS: ICD-10-CM

## 2025-03-17 DIAGNOSIS — E06.3 CHRONIC LYMPHOCYTIC THYROIDITIS: Primary | ICD-10-CM

## 2025-03-17 DIAGNOSIS — Z79.899 NEED FOR PROPHYLACTIC CHEMOTHERAPY: ICD-10-CM

## 2025-03-17 DIAGNOSIS — N95.1 SYMPTOMATIC MENOPAUSAL OR FEMALE CLIMACTERIC STATES: ICD-10-CM

## 2025-03-17 PROCEDURE — 84443 ASSAY THYROID STIM HORMONE: CPT

## 2025-03-17 PROCEDURE — 83789 MASS SPECTROMETRY QUAL/QUAN: CPT

## 2025-03-17 PROCEDURE — 86800 THYROGLOBULIN ANTIBODY: CPT

## 2025-03-17 PROCEDURE — 84481 FREE ASSAY (FT-3): CPT

## 2025-03-17 PROCEDURE — 84439 ASSAY OF FREE THYROXINE: CPT

## 2025-03-17 PROCEDURE — 36415 COLL VENOUS BLD VENIPUNCTURE: CPT

## 2025-03-17 PROCEDURE — 80048 BASIC METABOLIC PNL TOTAL CA: CPT

## 2025-03-17 PROCEDURE — 86376 MICROSOMAL ANTIBODY EACH: CPT

## 2025-03-17 PROCEDURE — 82627 DEHYDROEPIANDROSTERONE: CPT

## 2025-03-18 LAB
ANION GAP SERPL CALCULATED.3IONS-SCNC: 11 MMOL/L (ref 5–15)
BUN SERPL-MCNC: 15 MG/DL (ref 8–23)
BUN/CREAT SERPL: 22.1 (ref 7–25)
CALCIUM SPEC-SCNC: 9.6 MG/DL (ref 8.6–10.5)
CHLORIDE SERPL-SCNC: 104 MMOL/L (ref 98–107)
CO2 SERPL-SCNC: 23 MMOL/L (ref 22–29)
CREAT SERPL-MCNC: 0.68 MG/DL (ref 0.57–1)
EGFRCR SERPLBLD CKD-EPI 2021: 99.8 ML/MIN/1.73
GLUCOSE SERPL-MCNC: 75 MG/DL (ref 65–99)
POTASSIUM SERPL-SCNC: 4.1 MMOL/L (ref 3.5–5.2)
SODIUM SERPL-SCNC: 138 MMOL/L (ref 136–145)
T3FREE SERPL-MCNC: 2.99 PG/ML (ref 2–4.4)
T4 FREE SERPL-MCNC: 1.6 NG/DL (ref 0.92–1.68)
TSH SERPL DL<=0.05 MIU/L-ACNC: 0.22 UIU/ML (ref 0.27–4.2)

## 2025-03-19 LAB
DHEA-S SERPL-MCNC: 276 UG/DL (ref 29.4–220.5)
THYROGLOB AB SERPL-ACNC: <1 IU/ML (ref 0–0.9)
THYROPEROXIDASE AB SERPL-ACNC: 268 IU/ML (ref 0–34)

## 2025-03-21 LAB — IODINE SERPL-MCNC: 66.2 UG/L (ref 31.1–64.6)

## 2025-06-23 ENCOUNTER — LAB (OUTPATIENT)
Dept: LAB | Facility: HOSPITAL | Age: 60
End: 2025-06-23
Payer: COMMERCIAL

## 2025-06-23 ENCOUNTER — TRANSCRIBE ORDERS (OUTPATIENT)
Dept: LAB | Facility: HOSPITAL | Age: 60
End: 2025-06-23
Payer: COMMERCIAL

## 2025-06-23 DIAGNOSIS — E61.8 IODINE DEFICIENCY DISEASE, NON GOITER: ICD-10-CM

## 2025-06-23 DIAGNOSIS — N95.1 SYMPTOMATIC MENOPAUSAL OR FEMALE CLIMACTERIC STATES: ICD-10-CM

## 2025-06-23 DIAGNOSIS — E06.3 CHRONIC LYMPHOCYTIC THYROIDITIS: ICD-10-CM

## 2025-06-23 DIAGNOSIS — R73.03 DIABETES MELLITUS, LATENT: ICD-10-CM

## 2025-06-23 DIAGNOSIS — N95.1 SYMPTOMATIC MENOPAUSAL OR FEMALE CLIMACTERIC STATES: Primary | ICD-10-CM

## 2025-06-23 LAB
HBA1C MFR BLD: 5.4 % (ref 4.8–5.6)
T3FREE SERPL-MCNC: 3.19 PG/ML (ref 2–4.4)
T4 FREE SERPL-MCNC: 1.4 NG/DL (ref 0.92–1.68)
TSH SERPL DL<=0.05 MIU/L-ACNC: 0.1 UIU/ML (ref 0.27–4.2)

## 2025-06-23 PROCEDURE — 82627 DEHYDROEPIANDROSTERONE: CPT

## 2025-06-23 PROCEDURE — 36415 COLL VENOUS BLD VENIPUNCTURE: CPT

## 2025-06-23 PROCEDURE — 84443 ASSAY THYROID STIM HORMONE: CPT

## 2025-06-23 PROCEDURE — 84439 ASSAY OF FREE THYROXINE: CPT | Performed by: FAMILY MEDICINE

## 2025-06-23 PROCEDURE — 83789 MASS SPECTROMETRY QUAL/QUAN: CPT

## 2025-06-23 PROCEDURE — 84481 FREE ASSAY (FT-3): CPT

## 2025-06-23 PROCEDURE — 86800 THYROGLOBULIN ANTIBODY: CPT

## 2025-06-23 PROCEDURE — 86376 MICROSOMAL ANTIBODY EACH: CPT

## 2025-06-23 PROCEDURE — 83036 HEMOGLOBIN GLYCOSYLATED A1C: CPT

## 2025-06-24 LAB
DHEA-S SERPL-MCNC: 65.7 UG/DL (ref 29.4–220.5)
THYROGLOB AB SERPL-ACNC: <1 IU/ML (ref 0–0.9)
THYROPEROXIDASE AB SERPL-ACNC: 162 IU/ML (ref 0–34)

## 2025-06-26 LAB — IODINE SERPL-MCNC: 60.4 UG/L (ref 31.1–64.6)
